# Patient Record
Sex: MALE | Race: WHITE | NOT HISPANIC OR LATINO | URBAN - METROPOLITAN AREA
[De-identification: names, ages, dates, MRNs, and addresses within clinical notes are randomized per-mention and may not be internally consistent; named-entity substitution may affect disease eponyms.]

---

## 2017-09-18 ENCOUNTER — EMERGENCY (EMERGENCY)
Facility: HOSPITAL | Age: 42
LOS: 1 days | Discharge: HOME | End: 2017-09-18
Attending: EMERGENCY MEDICINE | Admitting: EMERGENCY MEDICINE
Payer: MEDICAID

## 2017-09-18 VITALS
DIASTOLIC BLOOD PRESSURE: 77 MMHG | TEMPERATURE: 98 F | HEART RATE: 64 BPM | SYSTOLIC BLOOD PRESSURE: 118 MMHG | OXYGEN SATURATION: 95 % | RESPIRATION RATE: 18 BRPM

## 2017-09-18 VITALS
HEART RATE: 81 BPM | OXYGEN SATURATION: 97 % | DIASTOLIC BLOOD PRESSURE: 73 MMHG | SYSTOLIC BLOOD PRESSURE: 116 MMHG | TEMPERATURE: 98 F | WEIGHT: 250.45 LBS | RESPIRATION RATE: 18 BRPM

## 2017-09-18 DIAGNOSIS — R10.31 RIGHT LOWER QUADRANT PAIN: ICD-10-CM

## 2017-09-18 DIAGNOSIS — K50.90 CROHN'S DISEASE, UNSPECIFIED, WITHOUT COMPLICATIONS: ICD-10-CM

## 2017-09-18 LAB
ALBUMIN SERPL ELPH-MCNC: 4.1 G/DL — SIGNIFICANT CHANGE UP (ref 3.3–5)
ALP SERPL-CCNC: 69 U/L — SIGNIFICANT CHANGE UP (ref 40–120)
ALT FLD-CCNC: 18 U/L — SIGNIFICANT CHANGE UP (ref 10–45)
ANION GAP SERPL CALC-SCNC: 17 MMOL/L — SIGNIFICANT CHANGE UP (ref 5–17)
AST SERPL-CCNC: 32 U/L — SIGNIFICANT CHANGE UP (ref 10–40)
BASOPHILS NFR BLD AUTO: 0.7 % — SIGNIFICANT CHANGE UP (ref 0–2)
BILIRUB SERPL-MCNC: 0.3 MG/DL — SIGNIFICANT CHANGE UP (ref 0.2–1.2)
BUN SERPL-MCNC: 13 MG/DL — SIGNIFICANT CHANGE UP (ref 7–23)
CALCIUM SERPL-MCNC: 8.6 MG/DL — SIGNIFICANT CHANGE UP (ref 8.4–10.5)
CHLORIDE SERPL-SCNC: 100 MMOL/L — SIGNIFICANT CHANGE UP (ref 96–108)
CO2 SERPL-SCNC: 21 MMOL/L — LOW (ref 22–31)
CREAT SERPL-MCNC: 0.9 MG/DL — SIGNIFICANT CHANGE UP (ref 0.5–1.3)
EOSINOPHIL NFR BLD AUTO: 2.7 % — SIGNIFICANT CHANGE UP (ref 0–6)
GLUCOSE SERPL-MCNC: 94 MG/DL — SIGNIFICANT CHANGE UP (ref 70–99)
HCT VFR BLD CALC: 40.2 % — SIGNIFICANT CHANGE UP (ref 39–50)
HGB BLD-MCNC: 13.2 G/DL — SIGNIFICANT CHANGE UP (ref 13–17)
LACTATE SERPL-SCNC: 1.4 MMOL/L — SIGNIFICANT CHANGE UP (ref 0.5–2)
LYMPHOCYTES # BLD AUTO: 21.1 % — SIGNIFICANT CHANGE UP (ref 13–44)
MCHC RBC-ENTMCNC: 28.2 PG — SIGNIFICANT CHANGE UP (ref 27–34)
MCHC RBC-ENTMCNC: 32.8 G/DL — SIGNIFICANT CHANGE UP (ref 32–36)
MCV RBC AUTO: 85.9 FL — SIGNIFICANT CHANGE UP (ref 80–100)
MONOCYTES NFR BLD AUTO: 8.8 % — SIGNIFICANT CHANGE UP (ref 2–14)
NEUTROPHILS NFR BLD AUTO: 66.7 % — SIGNIFICANT CHANGE UP (ref 43–77)
PLATELET # BLD AUTO: 317 K/UL — SIGNIFICANT CHANGE UP (ref 150–400)
POTASSIUM SERPL-MCNC: 4.4 MMOL/L — SIGNIFICANT CHANGE UP (ref 3.5–5.3)
POTASSIUM SERPL-SCNC: 4.4 MMOL/L — SIGNIFICANT CHANGE UP (ref 3.5–5.3)
PROT SERPL-MCNC: 7.9 G/DL — SIGNIFICANT CHANGE UP (ref 6–8.3)
RBC # BLD: 4.68 M/UL — SIGNIFICANT CHANGE UP (ref 4.2–5.8)
RBC # FLD: 14.7 % — SIGNIFICANT CHANGE UP (ref 10.3–16.9)
SODIUM SERPL-SCNC: 138 MMOL/L — SIGNIFICANT CHANGE UP (ref 135–145)
WBC # BLD: 6.9 K/UL — SIGNIFICANT CHANGE UP (ref 3.8–10.5)
WBC # FLD AUTO: 6.9 K/UL — SIGNIFICANT CHANGE UP (ref 3.8–10.5)

## 2017-09-18 PROCEDURE — 83690 ASSAY OF LIPASE: CPT

## 2017-09-18 PROCEDURE — 96376 TX/PRO/DX INJ SAME DRUG ADON: CPT

## 2017-09-18 PROCEDURE — 85025 COMPLETE CBC W/AUTO DIFF WBC: CPT

## 2017-09-18 PROCEDURE — 96374 THER/PROPH/DIAG INJ IV PUSH: CPT | Mod: XU

## 2017-09-18 PROCEDURE — 83605 ASSAY OF LACTIC ACID: CPT

## 2017-09-18 PROCEDURE — 99284 EMERGENCY DEPT VISIT MOD MDM: CPT | Mod: 25

## 2017-09-18 PROCEDURE — 96375 TX/PRO/DX INJ NEW DRUG ADDON: CPT | Mod: XU

## 2017-09-18 PROCEDURE — 99285 EMERGENCY DEPT VISIT HI MDM: CPT

## 2017-09-18 PROCEDURE — 74177 CT ABD & PELVIS W/CONTRAST: CPT

## 2017-09-18 PROCEDURE — 36415 COLL VENOUS BLD VENIPUNCTURE: CPT

## 2017-09-18 PROCEDURE — 80053 COMPREHEN METABOLIC PANEL: CPT

## 2017-09-18 PROCEDURE — 74177 CT ABD & PELVIS W/CONTRAST: CPT | Mod: 26

## 2017-09-18 RX ORDER — SODIUM CHLORIDE 9 MG/ML
3 INJECTION INTRAMUSCULAR; INTRAVENOUS; SUBCUTANEOUS ONCE
Qty: 0 | Refills: 0 | Status: COMPLETED | OUTPATIENT
Start: 2017-09-18 | End: 2017-09-18

## 2017-09-18 RX ORDER — IOHEXOL 300 MG/ML
50 INJECTION, SOLUTION INTRAVENOUS ONCE
Qty: 0 | Refills: 0 | Status: COMPLETED | OUTPATIENT
Start: 2017-09-18 | End: 2017-09-18

## 2017-09-18 RX ORDER — ONDANSETRON 8 MG/1
4 TABLET, FILM COATED ORAL ONCE
Qty: 0 | Refills: 0 | Status: COMPLETED | OUTPATIENT
Start: 2017-09-18 | End: 2017-09-18

## 2017-09-18 RX ORDER — MORPHINE SULFATE 50 MG/1
4 CAPSULE, EXTENDED RELEASE ORAL ONCE
Qty: 0 | Refills: 0 | Status: DISCONTINUED | OUTPATIENT
Start: 2017-09-18 | End: 2017-09-18

## 2017-09-18 RX ORDER — SODIUM CHLORIDE 9 MG/ML
1000 INJECTION INTRAMUSCULAR; INTRAVENOUS; SUBCUTANEOUS ONCE
Qty: 0 | Refills: 0 | Status: COMPLETED | OUTPATIENT
Start: 2017-09-18 | End: 2017-09-18

## 2017-09-18 RX ADMIN — MORPHINE SULFATE 4 MILLIGRAM(S): 50 CAPSULE, EXTENDED RELEASE ORAL at 08:56

## 2017-09-18 RX ADMIN — MORPHINE SULFATE 4 MILLIGRAM(S): 50 CAPSULE, EXTENDED RELEASE ORAL at 08:26

## 2017-09-18 RX ADMIN — ONDANSETRON 4 MILLIGRAM(S): 8 TABLET, FILM COATED ORAL at 08:59

## 2017-09-18 RX ADMIN — MORPHINE SULFATE 4 MILLIGRAM(S): 50 CAPSULE, EXTENDED RELEASE ORAL at 10:41

## 2017-09-18 RX ADMIN — SODIUM CHLORIDE 1000 MILLILITER(S): 9 INJECTION INTRAMUSCULAR; INTRAVENOUS; SUBCUTANEOUS at 08:59

## 2017-09-18 RX ADMIN — SODIUM CHLORIDE 3 MILLILITER(S): 9 INJECTION INTRAMUSCULAR; INTRAVENOUS; SUBCUTANEOUS at 08:47

## 2017-09-18 RX ADMIN — IOHEXOL 50 MILLILITER(S): 300 INJECTION, SOLUTION INTRAVENOUS at 08:58

## 2017-09-18 NOTE — ED PROVIDER NOTE - OBJECTIVE STATEMENT
Patient is a 41 year old male with PMH Crohn's disease who presents to ED c/o RLQ abdominal pain, associated with multiple episodes of bloody diarrhea and vomiting x few days. Pt reports that symptoms are similar to history of Crohn's flare. His last flare was 3 months ago in [facility]. Pt seen here for similar flare last year, was advised to start Remicade treatment, however he has not as of yet because he has been traveling back and forth. Was last treated with mesalamine 3 months ago. Previously on long term steroids. Denies fever, chills, urinary symptoms, back pain, chest pain. Reports a "surgical procedure for Crohn's, unclear what was done." Admits to drinking 6 beers last night.

## 2017-09-18 NOTE — ED PROVIDER NOTE - ATTENDING CONTRIBUTION TO CARE
Young M hx of Crohns presenting with RLQ multiple episodes of loose nonbloody stool x days.  No fever, chills.  Pt well, mild RLQ ttp, no r/g.  VSS.  Labs and CT noted.  Plan outpt tx and fu for likely Crohns flare.

## 2017-09-18 NOTE — ED PROVIDER NOTE - MEDICAL DECISION MAKING DETAILS
Patient is a 41 year old male with PMH Crohn's disease who presents to ED c/o RLQ abdominal pain, associated with multiple episodes of bloody diarrhea and vomiting x few days. Morphine, zofran and IV NS given. Labs sent-unremarkable, normal wbc. CT consistent with Crohn's, no obstruction, no abscess. Pt tolerating PO, vitals stable. Will start on short term steroids, 5mg PO prednisone. Arrangements made for GI follow up by ED coordinator.

## 2017-09-18 NOTE — ED ADULT NURSE NOTE - OBJECTIVE STATEMENT
Pt w PMH of Crohn's and Hep C presents to ED c/o severe LLQ abdom pain associated w n/v/d. Pt unsure if his appendix is intact. He states he hasn't slept in days due to sx and last night consumed a six pack of beer and fell asleep and came to ED this morning.

## 2018-06-07 VITALS
RESPIRATION RATE: 18 BRPM | DIASTOLIC BLOOD PRESSURE: 86 MMHG | OXYGEN SATURATION: 96 % | TEMPERATURE: 98 F | HEART RATE: 116 BPM | SYSTOLIC BLOOD PRESSURE: 120 MMHG

## 2018-06-07 LAB
ALBUMIN SERPL ELPH-MCNC: 4.4 G/DL — SIGNIFICANT CHANGE UP (ref 3.3–5)
ALP SERPL-CCNC: 92 U/L — SIGNIFICANT CHANGE UP (ref 40–120)
ALT FLD-CCNC: 68 U/L — HIGH (ref 10–45)
ANION GAP SERPL CALC-SCNC: 13 MMOL/L — SIGNIFICANT CHANGE UP (ref 5–17)
AST SERPL-CCNC: 74 U/L — HIGH (ref 10–40)
BASOPHILS NFR BLD AUTO: 0.5 % — SIGNIFICANT CHANGE UP (ref 0–2)
BILIRUB SERPL-MCNC: 0.5 MG/DL — SIGNIFICANT CHANGE UP (ref 0.2–1.2)
BUN SERPL-MCNC: 10 MG/DL — SIGNIFICANT CHANGE UP (ref 7–23)
CALCIUM SERPL-MCNC: 10.3 MG/DL — SIGNIFICANT CHANGE UP (ref 8.4–10.5)
CHLORIDE SERPL-SCNC: 98 MMOL/L — SIGNIFICANT CHANGE UP (ref 96–108)
CO2 SERPL-SCNC: 27 MMOL/L — SIGNIFICANT CHANGE UP (ref 22–31)
CREAT SERPL-MCNC: 1.04 MG/DL — SIGNIFICANT CHANGE UP (ref 0.5–1.3)
CRP SERPL-MCNC: 0.56 MG/DL — HIGH (ref 0–0.4)
EOSINOPHIL NFR BLD AUTO: 1 % — SIGNIFICANT CHANGE UP (ref 0–6)
ERYTHROCYTE [SEDIMENTATION RATE] IN BLOOD: 13 MM/HR — SIGNIFICANT CHANGE UP
GLUCOSE SERPL-MCNC: 128 MG/DL — HIGH (ref 70–99)
HCT VFR BLD CALC: 43.5 % — SIGNIFICANT CHANGE UP (ref 39–50)
HGB BLD-MCNC: 14.6 G/DL — SIGNIFICANT CHANGE UP (ref 13–17)
LIDOCAIN IGE QN: 69 U/L — HIGH (ref 7–60)
LYMPHOCYTES # BLD AUTO: 17.1 % — SIGNIFICANT CHANGE UP (ref 13–44)
MCHC RBC-ENTMCNC: 29.3 PG — SIGNIFICANT CHANGE UP (ref 27–34)
MCHC RBC-ENTMCNC: 33.6 G/DL — SIGNIFICANT CHANGE UP (ref 32–36)
MCV RBC AUTO: 87.3 FL — SIGNIFICANT CHANGE UP (ref 80–100)
MONOCYTES NFR BLD AUTO: 11 % — SIGNIFICANT CHANGE UP (ref 2–14)
NEUTROPHILS NFR BLD AUTO: 70.4 % — SIGNIFICANT CHANGE UP (ref 43–77)
PLATELET # BLD AUTO: 266 K/UL — SIGNIFICANT CHANGE UP (ref 150–400)
POTASSIUM SERPL-MCNC: 4 MMOL/L — SIGNIFICANT CHANGE UP (ref 3.5–5.3)
POTASSIUM SERPL-SCNC: 4 MMOL/L — SIGNIFICANT CHANGE UP (ref 3.5–5.3)
PROT SERPL-MCNC: 8.1 G/DL — SIGNIFICANT CHANGE UP (ref 6–8.3)
RBC # BLD: 4.98 M/UL — SIGNIFICANT CHANGE UP (ref 4.2–5.8)
RBC # FLD: 14.5 % — SIGNIFICANT CHANGE UP (ref 10.3–16.9)
SODIUM SERPL-SCNC: 138 MMOL/L — SIGNIFICANT CHANGE UP (ref 135–145)
WBC # BLD: 5.8 K/UL — SIGNIFICANT CHANGE UP (ref 3.8–10.5)
WBC # FLD AUTO: 5.8 K/UL — SIGNIFICANT CHANGE UP (ref 3.8–10.5)

## 2018-06-07 PROCEDURE — 99284 EMERGENCY DEPT VISIT MOD MDM: CPT | Mod: 25

## 2018-06-07 PROCEDURE — 74018 RADEX ABDOMEN 1 VIEW: CPT | Mod: 26

## 2018-06-07 PROCEDURE — 93010 ELECTROCARDIOGRAM REPORT: CPT

## 2018-06-07 PROCEDURE — 74177 CT ABD & PELVIS W/CONTRAST: CPT | Mod: 26

## 2018-06-07 RX ORDER — MORPHINE SULFATE 50 MG/1
4 CAPSULE, EXTENDED RELEASE ORAL ONCE
Qty: 0 | Refills: 0 | Status: DISCONTINUED | OUTPATIENT
Start: 2018-06-07 | End: 2018-06-07

## 2018-06-07 RX ORDER — HYDROMORPHONE HYDROCHLORIDE 2 MG/ML
1 INJECTION INTRAMUSCULAR; INTRAVENOUS; SUBCUTANEOUS ONCE
Qty: 0 | Refills: 0 | Status: DISCONTINUED | OUTPATIENT
Start: 2018-06-07 | End: 2018-06-07

## 2018-06-07 RX ORDER — MOXIFLOXACIN HYDROCHLORIDE TABLETS, 400 MG 400 MG/1
1 TABLET, FILM COATED ORAL
Qty: 14 | Refills: 0 | OUTPATIENT
Start: 2018-06-07 | End: 2018-06-13

## 2018-06-07 RX ORDER — CIPROFLOXACIN LACTATE 400MG/40ML
400 VIAL (ML) INTRAVENOUS ONCE
Qty: 0 | Refills: 0 | Status: COMPLETED | OUTPATIENT
Start: 2018-06-07 | End: 2018-06-07

## 2018-06-07 RX ORDER — KETOROLAC TROMETHAMINE 30 MG/ML
30 SYRINGE (ML) INJECTION ONCE
Qty: 0 | Refills: 0 | Status: DISCONTINUED | OUTPATIENT
Start: 2018-06-07 | End: 2018-06-07

## 2018-06-07 RX ORDER — METRONIDAZOLE 500 MG
1 TABLET ORAL
Qty: 21 | Refills: 0 | OUTPATIENT
Start: 2018-06-07 | End: 2018-06-13

## 2018-06-07 RX ORDER — IOHEXOL 300 MG/ML
50 INJECTION, SOLUTION INTRAVENOUS ONCE
Qty: 0 | Refills: 0 | Status: COMPLETED | OUTPATIENT
Start: 2018-06-07 | End: 2018-06-07

## 2018-06-07 RX ORDER — OXYCODONE AND ACETAMINOPHEN 5; 325 MG/1; MG/1
2 TABLET ORAL ONCE
Qty: 0 | Refills: 0 | Status: DISCONTINUED | OUTPATIENT
Start: 2018-06-07 | End: 2018-06-07

## 2018-06-07 RX ORDER — MORPHINE SULFATE 50 MG/1
8 CAPSULE, EXTENDED RELEASE ORAL ONCE
Qty: 0 | Refills: 0 | Status: DISCONTINUED | OUTPATIENT
Start: 2018-06-07 | End: 2018-06-07

## 2018-06-07 RX ORDER — METRONIDAZOLE 500 MG
500 TABLET ORAL ONCE
Qty: 0 | Refills: 0 | Status: COMPLETED | OUTPATIENT
Start: 2018-06-07 | End: 2018-06-07

## 2018-06-07 RX ORDER — ONDANSETRON 8 MG/1
4 TABLET, FILM COATED ORAL ONCE
Qty: 0 | Refills: 0 | Status: COMPLETED | OUTPATIENT
Start: 2018-06-07 | End: 2018-06-07

## 2018-06-07 RX ADMIN — MORPHINE SULFATE 8 MILLIGRAM(S): 50 CAPSULE, EXTENDED RELEASE ORAL at 17:04

## 2018-06-07 RX ADMIN — MORPHINE SULFATE 8 MILLIGRAM(S): 50 CAPSULE, EXTENDED RELEASE ORAL at 20:43

## 2018-06-07 RX ADMIN — Medication 200 MILLIGRAM(S): at 20:18

## 2018-06-07 RX ADMIN — MORPHINE SULFATE 4 MILLIGRAM(S): 50 CAPSULE, EXTENDED RELEASE ORAL at 20:21

## 2018-06-07 RX ADMIN — Medication 100 MILLIGRAM(S): at 20:39

## 2018-06-07 RX ADMIN — IOHEXOL 50 MILLILITER(S): 300 INJECTION, SOLUTION INTRAVENOUS at 16:19

## 2018-06-07 RX ADMIN — Medication 30 MILLIGRAM(S): at 20:39

## 2018-06-07 RX ADMIN — Medication 30 MILLIGRAM(S): at 16:00

## 2018-06-07 RX ADMIN — ONDANSETRON 104 MILLIGRAM(S): 8 TABLET, FILM COATED ORAL at 16:53

## 2018-06-07 NOTE — ED PROVIDER NOTE - PROGRESS NOTE DETAILS
surgery consult in ED and plan medical admission for Crohn's flare no overt concerns for obstruction and  no surgical plans ( surgery to follow as consult )

## 2018-06-07 NOTE — ED ADULT NURSE NOTE - OBJECTIVE STATEMENT
pt presents to RLQ abdominal pain for four days w/ six episodes of blood in stool. hx of chrohn. denies n/v.

## 2018-06-07 NOTE — ED ADULT TRIAGE NOTE - CHIEF COMPLAINT QUOTE
pt has a history of chron' s disease , pt c/o abdominal pain for 4 days , pt also c/o diarrhea , noticed blood in stool , pt also c/o weakness , tp states " I amlmost passed out 3 tiems this week "

## 2018-06-07 NOTE — ED PROVIDER NOTE - MEDICAL DECISION MAKING DETAILS
41 y/o male with hx Chron's disease currently symptomatic. Check labs, pain control, nausea control. Order abd CT 41 y/o male with hx Chron's disease currently symptomatic. Check labs, pain control, nausea control.  Abd CT c/w chron's dx, with A few mildly dilated loops of small bowel with possible segments of small  bowel luminal narrowing. Cannot exclude mild/low grade obstruction. also terminal ileum inflammation.  Patient started on IV antibiotics Cipro and Flagyl. Pt with abd XR follow-up after CT to evaluate for pasage of contrast to rectum to r/o obstruction.  Hand off given to Dr. Cannon and ELI Riley

## 2018-06-07 NOTE — ED PROVIDER NOTE - OBJECTIVE STATEMENT
43 y/o male with hx Chron' s disease presents with R lower and upper quad abd pain. + diarrhea with blood, + vomiting, +C/-F .  Patient currently on a steroid taper. Patient believes this a Chron's flare 43 y/o male with hx Chron' s disease presents with R lower and upper quad abd pain. + diarrhea with blood, + vomiting, +C/-F .  Patient currently on a steroid taper. Patient believes this a Chron's flare. Patient st non compliance with NSAIDs as he could not afford it. 41 y/o male with hx Chron' s disease presents with R lower and upper quad abd pain. + diarrhea with blood, + vomiting, +C/-F .  Patient currently on a steroid taper. Patient believes this a Chron' s flare. Patient st non compliance with NSAIDs as he could not afford it. Will d/c to home after cipro, flagyl IV and repeat XR and pain control

## 2018-06-08 ENCOUNTER — INPATIENT (INPATIENT)
Facility: HOSPITAL | Age: 43
LOS: 1 days | Discharge: HOME | DRG: 386 | End: 2018-06-10
Attending: STUDENT IN AN ORGANIZED HEALTH CARE EDUCATION/TRAINING PROGRAM | Admitting: STUDENT IN AN ORGANIZED HEALTH CARE EDUCATION/TRAINING PROGRAM
Payer: COMMERCIAL

## 2018-06-08 DIAGNOSIS — R74.0 NONSPECIFIC ELEVATION OF LEVELS OF TRANSAMINASE AND LACTIC ACID DEHYDROGENASE [LDH]: ICD-10-CM

## 2018-06-08 DIAGNOSIS — R63.8 OTHER SYMPTOMS AND SIGNS CONCERNING FOOD AND FLUID INTAKE: ICD-10-CM

## 2018-06-08 DIAGNOSIS — Z29.9 ENCOUNTER FOR PROPHYLACTIC MEASURES, UNSPECIFIED: ICD-10-CM

## 2018-06-08 DIAGNOSIS — B19.20 UNSPECIFIED VIRAL HEPATITIS C WITHOUT HEPATIC COMA: ICD-10-CM

## 2018-06-08 DIAGNOSIS — K50.10 CROHN'S DISEASE OF LARGE INTESTINE WITHOUT COMPLICATIONS: ICD-10-CM

## 2018-06-08 LAB
ALBUMIN SERPL ELPH-MCNC: 4 G/DL — SIGNIFICANT CHANGE UP (ref 3.3–5)
ALP SERPL-CCNC: 84 U/L — SIGNIFICANT CHANGE UP (ref 40–120)
ALT FLD-CCNC: 59 U/L — HIGH (ref 10–45)
ANION GAP SERPL CALC-SCNC: 12 MMOL/L — SIGNIFICANT CHANGE UP (ref 5–17)
APTT BLD: 35.2 SEC — SIGNIFICANT CHANGE UP (ref 27.5–37.4)
AST SERPL-CCNC: 59 U/L — HIGH (ref 10–40)
BILIRUB SERPL-MCNC: 0.8 MG/DL — SIGNIFICANT CHANGE UP (ref 0.2–1.2)
BUN SERPL-MCNC: 14 MG/DL — SIGNIFICANT CHANGE UP (ref 7–23)
C DIFF GDH STL QL: NEGATIVE — SIGNIFICANT CHANGE UP
C DIFF GDH STL QL: SIGNIFICANT CHANGE UP
CALCIUM SERPL-MCNC: 9.5 MG/DL — SIGNIFICANT CHANGE UP (ref 8.4–10.5)
CHLORIDE SERPL-SCNC: 98 MMOL/L — SIGNIFICANT CHANGE UP (ref 96–108)
CO2 SERPL-SCNC: 25 MMOL/L — SIGNIFICANT CHANGE UP (ref 22–31)
CREAT SERPL-MCNC: 1.08 MG/DL — SIGNIFICANT CHANGE UP (ref 0.5–1.3)
EXTRA SST TUBE: SIGNIFICANT CHANGE UP
GLUCOSE BLDC GLUCOMTR-MCNC: 128 MG/DL — HIGH (ref 70–99)
GLUCOSE BLDC GLUCOMTR-MCNC: 135 MG/DL — HIGH (ref 70–99)
GLUCOSE BLDC GLUCOMTR-MCNC: 136 MG/DL — HIGH (ref 70–99)
GLUCOSE BLDC GLUCOMTR-MCNC: 197 MG/DL — HIGH (ref 70–99)
GLUCOSE SERPL-MCNC: 115 MG/DL — HIGH (ref 70–99)
HCT VFR BLD CALC: 42.4 % — SIGNIFICANT CHANGE UP (ref 39–50)
HGB BLD-MCNC: 13.7 G/DL — SIGNIFICANT CHANGE UP (ref 13–17)
INR BLD: 0.9 — SIGNIFICANT CHANGE UP (ref 0.88–1.16)
MAGNESIUM SERPL-MCNC: 2.1 MG/DL — SIGNIFICANT CHANGE UP (ref 1.6–2.6)
MCHC RBC-ENTMCNC: 28.6 PG — SIGNIFICANT CHANGE UP (ref 27–34)
MCHC RBC-ENTMCNC: 32.3 G/DL — SIGNIFICANT CHANGE UP (ref 32–36)
MCV RBC AUTO: 88.5 FL — SIGNIFICANT CHANGE UP (ref 80–100)
PHOSPHATE SERPL-MCNC: 3.2 MG/DL — SIGNIFICANT CHANGE UP (ref 2.5–4.5)
PLATELET # BLD AUTO: 227 K/UL — SIGNIFICANT CHANGE UP (ref 150–400)
POTASSIUM SERPL-MCNC: 4.3 MMOL/L — SIGNIFICANT CHANGE UP (ref 3.5–5.3)
POTASSIUM SERPL-SCNC: 4.3 MMOL/L — SIGNIFICANT CHANGE UP (ref 3.5–5.3)
PROT SERPL-MCNC: 7.7 G/DL — SIGNIFICANT CHANGE UP (ref 6–8.3)
PROTHROM AB SERPL-ACNC: 10 SEC — SIGNIFICANT CHANGE UP (ref 9.8–12.7)
RBC # BLD: 4.79 M/UL — SIGNIFICANT CHANGE UP (ref 4.2–5.8)
RBC # FLD: 14.5 % — SIGNIFICANT CHANGE UP (ref 10.3–16.9)
SODIUM SERPL-SCNC: 135 MMOL/L — SIGNIFICANT CHANGE UP (ref 135–145)
WBC # BLD: 6.6 K/UL — SIGNIFICANT CHANGE UP (ref 3.8–10.5)
WBC # FLD AUTO: 6.6 K/UL — SIGNIFICANT CHANGE UP (ref 3.8–10.5)

## 2018-06-08 PROCEDURE — 74018 RADEX ABDOMEN 1 VIEW: CPT | Mod: 26

## 2018-06-08 PROCEDURE — 99223 1ST HOSP IP/OBS HIGH 75: CPT | Mod: GC

## 2018-06-08 PROCEDURE — 12345: CPT | Mod: GC,NC

## 2018-06-08 PROCEDURE — 99222 1ST HOSP IP/OBS MODERATE 55: CPT | Mod: GC

## 2018-06-08 RX ORDER — METRONIDAZOLE 500 MG
500 TABLET ORAL EVERY 8 HOURS
Qty: 0 | Refills: 0 | Status: DISCONTINUED | OUTPATIENT
Start: 2018-06-08 | End: 2018-06-08

## 2018-06-08 RX ORDER — CEFTRIAXONE 500 MG/1
INJECTION, POWDER, FOR SOLUTION INTRAMUSCULAR; INTRAVENOUS
Qty: 0 | Refills: 0 | Status: DISCONTINUED | OUTPATIENT
Start: 2018-06-08 | End: 2018-06-08

## 2018-06-08 RX ORDER — HEPARIN SODIUM 5000 [USP'U]/ML
5000 INJECTION INTRAVENOUS; SUBCUTANEOUS EVERY 8 HOURS
Qty: 0 | Refills: 0 | Status: DISCONTINUED | OUTPATIENT
Start: 2018-06-08 | End: 2018-06-10

## 2018-06-08 RX ORDER — DEXTROSE 50 % IN WATER 50 %
25 SYRINGE (ML) INTRAVENOUS ONCE
Qty: 0 | Refills: 0 | Status: DISCONTINUED | OUTPATIENT
Start: 2018-06-08 | End: 2018-06-10

## 2018-06-08 RX ORDER — INSULIN LISPRO 100/ML
VIAL (ML) SUBCUTANEOUS
Qty: 0 | Refills: 0 | Status: DISCONTINUED | OUTPATIENT
Start: 2018-06-08 | End: 2018-06-10

## 2018-06-08 RX ORDER — SODIUM CHLORIDE 9 MG/ML
1000 INJECTION INTRAMUSCULAR; INTRAVENOUS; SUBCUTANEOUS
Qty: 0 | Refills: 0 | Status: DISCONTINUED | OUTPATIENT
Start: 2018-06-08 | End: 2018-06-09

## 2018-06-08 RX ORDER — ACETAMINOPHEN 500 MG
650 TABLET ORAL EVERY 6 HOURS
Qty: 0 | Refills: 0 | Status: DISCONTINUED | OUTPATIENT
Start: 2018-06-08 | End: 2018-06-10

## 2018-06-08 RX ORDER — DEXTROSE 50 % IN WATER 50 %
12.5 SYRINGE (ML) INTRAVENOUS ONCE
Qty: 0 | Refills: 0 | Status: DISCONTINUED | OUTPATIENT
Start: 2018-06-08 | End: 2018-06-10

## 2018-06-08 RX ORDER — SODIUM CHLORIDE 9 MG/ML
1000 INJECTION, SOLUTION INTRAVENOUS
Qty: 0 | Refills: 0 | Status: DISCONTINUED | OUTPATIENT
Start: 2018-06-08 | End: 2018-06-10

## 2018-06-08 RX ORDER — PANTOPRAZOLE SODIUM 20 MG/1
40 TABLET, DELAYED RELEASE ORAL
Qty: 0 | Refills: 0 | Status: DISCONTINUED | OUTPATIENT
Start: 2018-06-08 | End: 2018-06-10

## 2018-06-08 RX ORDER — CEFTRIAXONE 500 MG/1
1 INJECTION, POWDER, FOR SOLUTION INTRAMUSCULAR; INTRAVENOUS ONCE
Qty: 0 | Refills: 0 | Status: COMPLETED | OUTPATIENT
Start: 2018-06-08 | End: 2018-06-08

## 2018-06-08 RX ORDER — ONDANSETRON 8 MG/1
4 TABLET, FILM COATED ORAL EVERY 6 HOURS
Qty: 0 | Refills: 0 | Status: DISCONTINUED | OUTPATIENT
Start: 2018-06-08 | End: 2018-06-10

## 2018-06-08 RX ORDER — DEXTROSE 50 % IN WATER 50 %
15 SYRINGE (ML) INTRAVENOUS ONCE
Qty: 0 | Refills: 0 | Status: DISCONTINUED | OUTPATIENT
Start: 2018-06-08 | End: 2018-06-10

## 2018-06-08 RX ORDER — GLUCAGON INJECTION, SOLUTION 0.5 MG/.1ML
1 INJECTION, SOLUTION SUBCUTANEOUS ONCE
Qty: 0 | Refills: 0 | Status: DISCONTINUED | OUTPATIENT
Start: 2018-06-08 | End: 2018-06-10

## 2018-06-08 RX ORDER — MORPHINE SULFATE 50 MG/1
2 CAPSULE, EXTENDED RELEASE ORAL EVERY 6 HOURS
Qty: 0 | Refills: 0 | Status: DISCONTINUED | OUTPATIENT
Start: 2018-06-08 | End: 2018-06-10

## 2018-06-08 RX ADMIN — HEPARIN SODIUM 5000 UNIT(S): 5000 INJECTION INTRAVENOUS; SUBCUTANEOUS at 14:09

## 2018-06-08 RX ADMIN — Medication 100 MILLIGRAM(S): at 03:41

## 2018-06-08 RX ADMIN — SODIUM CHLORIDE 100 MILLILITER(S): 9 INJECTION INTRAMUSCULAR; INTRAVENOUS; SUBCUTANEOUS at 14:09

## 2018-06-08 RX ADMIN — MORPHINE SULFATE 2 MILLIGRAM(S): 50 CAPSULE, EXTENDED RELEASE ORAL at 09:21

## 2018-06-08 RX ADMIN — Medication 40 MILLIGRAM(S): at 12:39

## 2018-06-08 RX ADMIN — MORPHINE SULFATE 2 MILLIGRAM(S): 50 CAPSULE, EXTENDED RELEASE ORAL at 03:14

## 2018-06-08 RX ADMIN — PANTOPRAZOLE SODIUM 40 MILLIGRAM(S): 20 TABLET, DELAYED RELEASE ORAL at 07:18

## 2018-06-08 RX ADMIN — HEPARIN SODIUM 5000 UNIT(S): 5000 INJECTION INTRAVENOUS; SUBCUTANEOUS at 07:18

## 2018-06-08 RX ADMIN — Medication 2: at 17:43

## 2018-06-08 RX ADMIN — Medication 40 MILLIGRAM(S): at 03:41

## 2018-06-08 RX ADMIN — MORPHINE SULFATE 2 MILLIGRAM(S): 50 CAPSULE, EXTENDED RELEASE ORAL at 15:37

## 2018-06-08 RX ADMIN — MORPHINE SULFATE 2 MILLIGRAM(S): 50 CAPSULE, EXTENDED RELEASE ORAL at 22:52

## 2018-06-08 RX ADMIN — MORPHINE SULFATE 2 MILLIGRAM(S): 50 CAPSULE, EXTENDED RELEASE ORAL at 03:00

## 2018-06-08 RX ADMIN — MORPHINE SULFATE 2 MILLIGRAM(S): 50 CAPSULE, EXTENDED RELEASE ORAL at 15:22

## 2018-06-08 RX ADMIN — HEPARIN SODIUM 5000 UNIT(S): 5000 INJECTION INTRAVENOUS; SUBCUTANEOUS at 22:52

## 2018-06-08 RX ADMIN — Medication 650 MILLIGRAM(S): at 17:43

## 2018-06-08 RX ADMIN — MORPHINE SULFATE 2 MILLIGRAM(S): 50 CAPSULE, EXTENDED RELEASE ORAL at 09:36

## 2018-06-08 RX ADMIN — MORPHINE SULFATE 2 MILLIGRAM(S): 50 CAPSULE, EXTENDED RELEASE ORAL at 23:07

## 2018-06-08 RX ADMIN — CEFTRIAXONE 100 GRAM(S): 500 INJECTION, POWDER, FOR SOLUTION INTRAMUSCULAR; INTRAVENOUS at 03:00

## 2018-06-08 RX ADMIN — SODIUM CHLORIDE 100 MILLILITER(S): 9 INJECTION INTRAMUSCULAR; INTRAVENOUS; SUBCUTANEOUS at 03:00

## 2018-06-08 RX ADMIN — Medication 100 MILLIGRAM(S): at 12:39

## 2018-06-08 NOTE — PROGRESS NOTE ADULT - SUBJECTIVE AND OBJECTIVE BOX
OVERNIGHT EVENTS:    SUBJECTIVE / INTERVAL HPI: Patient seen and examined at bedside.     VITAL SIGNS:  Vital Signs Last 24 Hrs  T(C): 36.6 (08 Jun 2018 08:47), Max: 37.2 (08 Jun 2018 05:11)  T(F): 97.9 (08 Jun 2018 08:47), Max: 98.9 (08 Jun 2018 05:11)  HR: 76 (08 Jun 2018 08:47) (72 - 116)  BP: 132/85 (08 Jun 2018 08:47) (103/71 - 132/85)  BP(mean): --  RR: 18 (08 Jun 2018 08:47) (17 - 18)  SpO2: 95% (08 Jun 2018 08:47) (95% - 97%)    PHYSICAL EXAM:    General: WDWN  HEENT: NC/AT; PERRL, anicteric sclera; MMM  Neck: supple  Cardiovascular: +S1/S2; RRR  Respiratory: CTA B/L; no W/R/R  Gastrointestinal: soft, NT/ND; +BSx4  Extremities: WWP; no edema, clubbing or cyanosis  Vascular: 2+ radial, DP/PT pulses B/L  Neurological: AAOx3; no focal deficits    MEDICATIONS:  MEDICATIONS  (STANDING):  dextrose 5%. 1000 milliLiter(s) (50 mL/Hr) IV Continuous <Continuous>  dextrose 50% Injectable 12.5 Gram(s) IV Push once  dextrose 50% Injectable 25 Gram(s) IV Push once  dextrose 50% Injectable 25 Gram(s) IV Push once  heparin  Injectable 5000 Unit(s) SubCutaneous every 8 hours  insulin lispro (HumaLOG) corrective regimen sliding scale   SubCutaneous Before meals and at bedtime  pantoprazole    Tablet 40 milliGRAM(s) Oral before breakfast  sodium chloride 0.9%. 1000 milliLiter(s) (100 mL/Hr) IV Continuous <Continuous>    MEDICATIONS  (PRN):  dextrose 40% Gel 15 Gram(s) Oral once PRN Blood Glucose LESS THAN 70 milliGRAM(s)/deciliter  glucagon  Injectable 1 milliGRAM(s) IntraMuscular once PRN Glucose LESS THAN 70 milligrams/deciliter  morphine  - Injectable 2 milliGRAM(s) IV Push every 6 hours PRN Severe Pain (7 - 10)  ondansetron Injectable 4 milliGRAM(s) IV Push every 6 hours PRN Nausea      ALLERGIES:  Allergies    No Known Allergies    Intolerances        LABS:                        13.7   6.6   )-----------( 227      ( 08 Jun 2018 07:08 )             42.4     06-08    135  |  98  |  14  ----------------------------<  115<H>  4.3   |  25  |  1.08    Ca    9.5      08 Jun 2018 07:08  Phos  3.2     06-08  Mg     2.1     06-08    TPro  7.7  /  Alb  4.0  /  TBili  0.8  /  DBili  x   /  AST  59<H>  /  ALT  59<H>  /  AlkPhos  84  06-08    PT/INR - ( 08 Jun 2018 07:08 )   PT: 10.0 sec;   INR: 0.90          PTT - ( 08 Jun 2018 07:08 )  PTT:35.2 sec    CAPILLARY BLOOD GLUCOSE      POCT Blood Glucose.: 135 mg/dL (08 Jun 2018 12:08)      RADIOLOGY & ADDITIONAL TESTS: Reviewed.    ASSESSMENT:    PLAN:

## 2018-06-08 NOTE — PROGRESS NOTE ADULT - ASSESSMENT
Mr. 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

## 2018-06-08 NOTE — CONSULT NOTE ADULT - ASSESSMENT
42yoM with PMH HCV, EtOH abuse, Crohn's disease recently hospitalized for a flare and discharged on a steroid taper and Pentasa, presents to the ED with 4-day history of abdominal pain, diarrhea, and emesis    No surgical intervention indicated at this time  Recommend medical/GI consult for Crohn's management, concern for terminal ileitis  If pt develops obstructive symptoms, please call Surgery for evaluation and possible NGT placement  Please repeat AXR in the morning to evaluate contrast flow  Will follow on Team 4C. Please call with any questions
41 YO M livan historian with PMHx Crohn's disease (Dx 9/2007) and chronic hepatitis C (no prior treatment) 2/2 tattoo ink needles and alcohol use p/w diarrhea, abdominal pain, vomiting, and joint pain x 4 days.    # Crohn's disease with flare  - Agree with ruling out for C. diff  - Recommend decreasing to Solumedrol 40 mg IVP daily  - Recommend discontinuing ciprofloxacin and flagyl as pt does not meet SIRS criteria and low suspicion for underlying infectious etiology of his symptoms  - Please check Hep B serology and quantiferon gold for possible biologic treatment in the future  - Discussed with pt at length regarding need for follow-up with his primary GI and initiation of long term maintenance therapy to control his CD flares, avoidance of the adverse effects of long term steroid use, and avoiding hospital admissions as he has had multiple admission for flares and has poor control of his underlying illness  - Please obtain report of his recent colonoscopy at [facility]    # Chronic HCV - stable  - Pt to follow-up with his primary GI for consideration of tx    Case d/w Dr. Dey  GI will follow

## 2018-06-08 NOTE — H&P ADULT - HISTORY OF PRESENT ILLNESS
Mr. 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 Patient has a longstanding history of Crohn's disease. He has never had a consistent Gastroenterologist to follow up with. He has been on Humira but said 'it didn't work,' and most recently he was supposed to be on Pentasa but has not been compliant because it was too expensive for him to afford. He has multiple admissions for abdominal pain 2/2 Crohn's flare. His last admission was at [place] where he was hospitalized for 1 week and was d/c 2 weeks ago. He was given antibx and steroids (d/c on Prednisone taper). Four days ago, he began having his 'usual Crohn's symptoms.' He endorses decreased appetite, abdominal pain that was sharp and radiating everywhere (mostly RLQ), and multiple episodes of diarrhea with blood streaks. He also felt sweaty but did not check his temperature. Has also had nausea and vomiting and cannot keep food down. He was supposed to see a GI doctor yesterday but couldn't make it due to severe pain.   He does not know when his last colonoscopy was.     No chills, sob, cp or recent travel sick contacts or exotic foods.     In ED VSS except tachycardia to around 110-120.     Given Cipro and Flagyl.     CT a/p performed and showed " Stigmata of chronic Crohn's disease. 5 cm segment of mild wall thickening and mucosal enhancement of the terminal ileum suspicious for mild acute inflammation. A few mildly dilated loops of small bowel with possible segments of small bowel luminal narrowing. Cannot exclude mild/low grade obstruction. Consider follow-up radiographs to evaluate progression of oral contrast to colon."    Surgery consulted and recommended only medical management and AXR in AM.

## 2018-06-08 NOTE — H&P ADULT - PROBLEM SELECTOR PLAN 1
patient presents with RLQ abdominal pain and diarrhea with hematochezia. he was recently d/c from [facility] after a Crohn's flare and has been on a prednisone taper. His symptoms began 4 days ago and have gotten worse. He is noncompliant with his maintenance therapy of Pentasa and reportedly has 'failed' biologics before, although he is a poor historian.   His crohn's disease activity index is 230 which correlates with mod to severe disease.   He was also tachycardic on presentation and dry on exam.   s/p Cipro and flagyl in ED.   CT a/p shows "Stigmata of chronic Crohn's disease. 5 cm segment of mild wall thickening and mucosal enhancement of the terminal ileum suspicious for mild acute inflammation. A few mildly dilated loops of small bowel with possible segments of small bowel luminal narrowing. Cannot exclude mild/low grade obstruction. Consider follow-up radiographs to evaluate progression of oral contrast to colon."  seen by surgery who recommend medical management but will follow   - NS at 100cc/hr for hydration  - Ceftriaxone 1q24 and flagyl 500q8  - check stool count, culture, o&p, and check for c-diff as common in IBD population (plus, patient was recently hospitalized and given antibx)  - consult GI in AM  - f/u subsequent surgery recs  - f/u AM AXR  - ensure adequate pain control  - monitor frequency and severity of BM's

## 2018-06-08 NOTE — H&P ADULT - PROBLEM SELECTOR PLAN 3
AST/ALT slightly elevated. likely from chronic hep C infection (untreated).   - can check RUQ u/s to r/o nodules/cirrhosis but not likely

## 2018-06-08 NOTE — MEDICAL STUDENT PROGRESS NOTE(EDUCATION) - SUBJECTIVE AND OBJECTIVE BOX
INTERVAL HPI/OVERNIGHT EVENTS:    OVERNIGHT: No overnight events.  SUBJECTIVE: Patient seen and examined at bedside.     ROS:  CV: Denies chest pain  Resp: Denies SOB  GI: Denies abdominal pain, constipation, diarrhea, nausea, vomiting  : Denies dysuria  ID: Denies fevers, chills  MSK: Denies joint pain     OBJECTIVE:    VITAL SIGNS:  ICU Vital Signs Last 24 Hrs  T(C): 37.2 (08 Jun 2018 05:11), Max: 37.2 (08 Jun 2018 05:11)  T(F): 98.9 (08 Jun 2018 05:11), Max: 98.9 (08 Jun 2018 05:11)  HR: 72 (08 Jun 2018 05:11) (72 - 116)  BP: 103/71 (08 Jun 2018 05:11) (103/71 - 127/82)  BP(mean): --  ABP: --  ABP(mean): --  RR: 18 (08 Jun 2018 05:11) (17 - 18)  SpO2: 97% (08 Jun 2018 05:11) (95% - 97%)        06-07 @ 07:01  -  06-08 @ 07:00  --------------------------------------------------------  IN: 350 mL / OUT: 0 mL / NET: 350 mL      CAPILLARY BLOOD GLUCOSE      POCT Blood Glucose.: 136 mg/dL (08 Jun 2018 08:18)      PHYSICAL EXAM:    General: NAD, comfortable  HEENT: NCAT, PERRL, clear conjunctiva, no scleral icterus  Neck: supple, no JVD  Respiratory: CTA b/l, no wheezing, rhonchi, rales  Cardiovascular: RRR, normal S1S2, no M/R/G  Abdomen: soft, NT/ND, bowel sounds in all four quadrants, no palpable masses  Extremities: WWP, no clubbing, cyanosis, or edema  Neuro: AOx3    MEDICATIONS:  MEDICATIONS  (STANDING):  cefTRIAXone   IVPB      dextrose 5%. 1000 milliLiter(s) (50 mL/Hr) IV Continuous <Continuous>  dextrose 50% Injectable 12.5 Gram(s) IV Push once  dextrose 50% Injectable 25 Gram(s) IV Push once  dextrose 50% Injectable 25 Gram(s) IV Push once  heparin  Injectable 5000 Unit(s) SubCutaneous every 8 hours  insulin lispro (HumaLOG) corrective regimen sliding scale   SubCutaneous Before meals and at bedtime  methylPREDNISolone sodium succinate Injectable 40 milliGRAM(s) IV Push every 8 hours  metroNIDAZOLE  IVPB 500 milliGRAM(s) IV Intermittent every 8 hours  pantoprazole    Tablet 40 milliGRAM(s) Oral before breakfast  sodium chloride 0.9%. 1000 milliLiter(s) (100 mL/Hr) IV Continuous <Continuous>    MEDICATIONS  (PRN):  dextrose 40% Gel 15 Gram(s) Oral once PRN Blood Glucose LESS THAN 70 milliGRAM(s)/deciliter  glucagon  Injectable 1 milliGRAM(s) IntraMuscular once PRN Glucose LESS THAN 70 milligrams/deciliter  morphine  - Injectable 2 milliGRAM(s) IV Push every 6 hours PRN Severe Pain (7 - 10)  ondansetron Injectable 4 milliGRAM(s) IV Push every 6 hours PRN Nausea      ALLERGIES:  Allergies    No Known Allergies    Intolerances        LABS:                        13.7   6.6   )-----------( 227      ( 08 Jun 2018 07:08 )             42.4     06-08    135  |  98  |  14  ----------------------------<  115<H>  4.3   |  25  |  1.08    Ca    9.5      08 Jun 2018 07:08  Phos  3.2     06-08  Mg     2.1     06-08    TPro  7.7  /  Alb  4.0  /  TBili  0.8  /  DBili  x   /  AST  59<H>  /  ALT  59<H>  /  AlkPhos  84  06-08    PT/INR - ( 08 Jun 2018 07:08 )   PT: 10.0 sec;   INR: 0.90          PTT - ( 08 Jun 2018 07:08 )  PTT:35.2 sec      RADIOLOGY & ADDITIONAL TESTS: Studies reviewed. INTERVAL HPI/OVERNIGHT EVENTS:    OVERNIGHT: No overnight events.  SUBJECTIVE: Patient seen and examined at bedside.     IVETTE is a 43 y/o man with a PMH of Crohn Disease and hepatitis C (from tattoo needles) who presented to the ED with three days of severe RLQ abdominal pain. His pain began a few days ago as a sharp pain in his RLQ with occasional "lightening strikes" where he feels the pain diffusely around his abdomen, back, and esophagus. This radiation pattern is new for him. The pain has been constant since the onset but the severity  waxes and wanes in waves. He feels like there is "something stuck" in his RLQ. He also reports associated sx of nausea, vomiting, and diarrhea. He also has felt the chills and night sweats. The diarrhea occurs about every two hours (LBM last night) and is watery with mucus and sometimes bright red blood. He also said that he's been feeling lightheaded, which he attributes to dehydration from the diarrhea, and has episodes of "fading" where he looses consciousness and falls. He was hospitalized at [facility] three weeks ago for similar sx and is very frustrated that he keeps being hospitalized and can't live his life. He has had Crohn's since Sep 2007 and has tried multiple medication regimens with no success. He feels that the anti-inflammatory drugs (eg Pentasa) work best for him but he can't afford them. He tried Humira but it didn't really help his sx. He is currently on steroids but wants to be done with them. His GI doctor is Anh Squires at [facility]. He was supposed to come in for an appointment but couldn't make it due to the pain.      PMH  Crohn disease  Hep C    PSH  Ankle repair 2014  C2 disc repair 2012    FHx  mother, father, and sister are healthy.    Meds    Allergies  None    Social  Previously light smoker but quit after diagnosis with CD in 2007. 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 Uses protection most of the time (not if it's a relationship)    ROS:  General: +night sweats, + chills, + sensitivity to light (attributed to lack of sleep) + occasional subjective LAD  CV: Denies chest pain  Resp: Has a cough most mornings, which he attributes to the second hand smoke  GI: + N/V +watery diarrhea with mucus and occasional blood. + difficulty swallowing  : Denies dysuria  MSK: + weakness   Psych: + feelings of depression    OBJECTIVE:    VITAL SIGNS:  ICU Vital Signs Last 24 Hrs  T(C): 37.2 (08 Jun 2018 05:11), Max: 37.2 (08 Jun 2018 05:11)  T(F): 98.9 (08 Jun 2018 05:11), Max: 98.9 (08 Jun 2018 05:11)  HR: 72 (08 Jun 2018 05:11) (72 - 116)  BP: 103/71 (08 Jun 2018 05:11) (103/71 - 127/82)  BP(mean): --  ABP: --  ABP(mean): --  RR: 18 (08 Jun 2018 05:11) (17 - 18)  SpO2: 97% (08 Jun 2018 05:11) (95% - 97%)        06-07 @ 07:01  -  06-08 @ 07:00  --------------------------------------------------------  IN: 350 mL / OUT: 0 mL / NET: 350 mL      CAPILLARY BLOOD GLUCOSE      POCT Blood Glucose.: 136 mg/dL (08 Jun 2018 08:18)      PHYSICAL EXAM:    General: NAD, uncomfortable and in pain  HEENT: NCAT, PERRL, clear conjunctiva, no scleral icterus  Neck: supple, no JVD,   Respiratory: CTA b/l, no wheezing, rhonchi, rales  Cardiovascular: RRR, normal S1S2, no M/R/G  Abdomen: distended, pain to palpation in all quadrants, worse in RLQ. + guarding but no rebound  Extremities: WWP, no clubbing, cyanosis, or edema, 2+ pulses b/l  Skin: no jaundice  Neuro: AOx3, CN III-XII intact    MEDICATIONS:  MEDICATIONS  (STANDING):  cefTRIAXone   IVPB      dextrose 5%. 1000 milliLiter(s) (50 mL/Hr) IV Continuous <Continuous>  dextrose 50% Injectable 12.5 Gram(s) IV Push once  dextrose 50% Injectable 25 Gram(s) IV Push once  dextrose 50% Injectable 25 Gram(s) IV Push once  heparin  Injectable 5000 Unit(s) SubCutaneous every 8 hours  insulin lispro (HumaLOG) corrective regimen sliding scale   SubCutaneous Before meals and at bedtime  methylPREDNISolone sodium succinate Injectable 40 milliGRAM(s) IV Push every 8 hours  metroNIDAZOLE  IVPB 500 milliGRAM(s) IV Intermittent every 8 hours  pantoprazole    Tablet 40 milliGRAM(s) Oral before breakfast  sodium chloride 0.9%. 1000 milliLiter(s) (100 mL/Hr) IV Continuous <Continuous>    MEDICATIONS  (PRN):  dextrose 40% Gel 15 Gram(s) Oral once PRN Blood Glucose LESS THAN 70 milliGRAM(s)/deciliter  glucagon  Injectable 1 milliGRAM(s) IntraMuscular once PRN Glucose LESS THAN 70 milligrams/deciliter  morphine  - Injectable 2 milliGRAM(s) IV Push every 6 hours PRN Severe Pain (7 - 10)  ondansetron Injectable 4 milliGRAM(s) IV Push every 6 hours PRN Nausea      ALLERGIES:  Allergies    No Known Allergies    Intolerances        LABS:                        13.7   6.6   )-----------( 227      ( 08 Jun 2018 07:08 )             42.4     06-08    135  |  98  |  14  ----------------------------<  115<H>  4.3   |  25  |  1.08    Ca    9.5      08 Jun 2018 07:08  Phos  3.2     06-08  Mg     2.1     06-08    TPro  7.7  /  Alb  4.0  /  TBili  0.8  /  DBili  x   /  AST  59<H>  /  ALT  59<H>  /  AlkPhos  84  06-08    PT/INR - ( 08 Jun 2018 07:08 )   PT: 10.0 sec;   INR: 0.90          PTT - ( 08 Jun 2018 07:08 )  PTT:35.2 sec      RADIOLOGY & ADDITIONAL TESTS: Studies reviewed.

## 2018-06-08 NOTE — MEDICAL STUDENT PROGRESS NOTE(EDUCATION) - NS MD HP STUD ASPLAN PLAN FT
1.	Crohn's colitis: Pt presented with RLQ pain and diarrhea with hematochezia. He was discharged from [facility] after a similar episode 3 weeks ago on antibiotics and a prednisone taper. He was not able to comply with Pentasa therapy as it is too expensive. His sx began about three days ago and have gotten worse. His CT and X-rays were consistent with a mild small bowel obstruction.  1.	consult with GI  2.	surgery consulted but states that surgery is not indicated  3.	check for C-diff, o&p, culture  4.	empiric abx  5.	follow AXRs    2.    Hepatitis C virus infection.  Plan: noncompliant with GI f/u and treatment.          No s/s or e/o liver disease.           - GI input in AM          - will check coags in AM (t-bili WNL and transaminases slightly elevated)          - consider RUQ u/s.       3.    Transaminitis.  Plan: AST/ALT slightly elevated. likely from chronic hep C infection (untreated).            - can check RUQ u/s to r/o nodules/cirrhosis but not likely.

## 2018-06-08 NOTE — PATIENT PROFILE ADULT. - NS TRANSFER PATIENT BELONGINGS
Cell Phone/PDA (specify)/Clothing/Cell phone  Electronic Device (specify)/Cell Phone/PDA (specify)/Jewelry/Money (specify)/Clothing/Cell phone

## 2018-06-08 NOTE — H&P ADULT - PROBLEM SELECTOR PLAN 2
noncompliant with GI f/u and treatment.   No s/s or e/o liver disease.   - GI input in AM  - will check coags in AM (t-bili WNL and transaminases slightly elevated)  - consider RUQ u/s

## 2018-06-08 NOTE — CONSULT NOTE ADULT - SUBJECTIVE AND OBJECTIVE BOX
HPI:  41 YO M poor historian with PMHx Crohn's disease (Dx 9/2007) and chronic hepatitis C (no prior treatment) 2/2 tattoo ink needles and alcohol use p/w diarrhea, abdominal pain, vomiting, and joint pain x 4 days. Pt states    . Patient has a longstanding history of Crohn's disease. He has never had a consistent Gastroenterologist to follow up with. He has been on Humira but said 'it didn't work,' and most recently he was supposed to be on Pentasa but has not been compliant because it was too expensive for him to afford. He has multiple admissions for abdominal pain 2/2 Crohn's flare. His last admission was at [place] where he was hospitalized for 1 week and was d/c 2 weeks ago. He was given antibx and steroids (d/c on Prednisone taper). Four days ago, he began having his 'usual Crohn's symptoms.' He endorses decreased appetite, abdominal pain that was sharp and radiating everywhere (mostly RLQ), and multiple episodes of diarrhea with blood streaks. He also felt sweaty but did not check his temperature. Has also had nausea and vomiting and cannot keep food down. He was supposed to see a GI doctor yesterday but couldn't make it due to severe pain.   He does not know when his last colonoscopy was.     No chills, sob, cp or recent travel sick contacts or exotic foods.     In ED VSS except tachycardia to around 110-120.     Given Cipro and Flagyl.     CT a/p performed and showed " Stigmata of chronic Crohn's disease. 5 cm segment of mild wall thickening and mucosal enhancement of the terminal ileum suspicious for mild acute inflammation. A few mildly dilated loops of small bowel with possible segments of small bowel luminal narrowing. Cannot exclude mild/low grade obstruction. Consider follow-up radiographs to evaluate progression of oral contrast to colon."    Surgery consulted and recommended only medical management and AXR in AM. (08 Jun 2018 02:01)    Allergies    No Known Allergies    Intolerances      MEDICATIONS:  MEDICATIONS  (STANDING):  cefTRIAXone   IVPB      dextrose 5%. 1000 milliLiter(s) (50 mL/Hr) IV Continuous <Continuous>  dextrose 50% Injectable 12.5 Gram(s) IV Push once  dextrose 50% Injectable 25 Gram(s) IV Push once  dextrose 50% Injectable 25 Gram(s) IV Push once  heparin  Injectable 5000 Unit(s) SubCutaneous every 8 hours  insulin lispro (HumaLOG) corrective regimen sliding scale   SubCutaneous Before meals and at bedtime  methylPREDNISolone sodium succinate Injectable 40 milliGRAM(s) IV Push every 8 hours  metroNIDAZOLE  IVPB 500 milliGRAM(s) IV Intermittent every 8 hours  pantoprazole    Tablet 40 milliGRAM(s) Oral before breakfast  sodium chloride 0.9%. 1000 milliLiter(s) (100 mL/Hr) IV Continuous <Continuous>    MEDICATIONS  (PRN):  dextrose 40% Gel 15 Gram(s) Oral once PRN Blood Glucose LESS THAN 70 milliGRAM(s)/deciliter  glucagon  Injectable 1 milliGRAM(s) IntraMuscular once PRN Glucose LESS THAN 70 milligrams/deciliter  morphine  - Injectable 2 milliGRAM(s) IV Push every 6 hours PRN Severe Pain (7 - 10)  ondansetron Injectable 4 milliGRAM(s) IV Push every 6 hours PRN Nausea    PAST MEDICAL & SURGICAL HISTORY:  Hepatitis C  Crohn disease  No significant past surgical history    FAMILY HISTORY:  No pertinent family history in first degree relatives    SOCIAL HISTORY:  Tobac[place]: [ ] Current, [ ] Former, [ ] Never; Pack Years:  Alcohol:  Illicit Drugs:    REVIEW OF SYSTEMS:  Constitutional: No fever, chills, weight loss, or fatigue  ENMT:  No visual changes; No difficulty hearing, tinnitus, vertigo; No sinus or throat pain  Neck: No pain or stiffness  Respiratory: No cough, wheezing, or hemoptysis; No shortness of breath  Cardiovascular: No chest pain, palpitations, dizziness, orthopnea, PND, or leg swelling  Gastrointestinal: No abdominal or epigastric pain. No  nausea, vomiting, or hematemesis. No diarrhea, constipation, or steatorrhea. No melena or hematochezia  Genitourinary: No dysuria, urinary frequency/hesitancy, or hematuria  Skin: No itching, burning, rashes or lesions   Musculoskeletal: No joint pain or swelling; No muscle, back or extremity pain    Vital Signs Last 24 Hrs  T(C): 36.6 (08 Jun 2018 08:47), Max: 37.2 (08 Jun 2018 05:11)  T(F): 97.9 (08 Jun 2018 08:47), Max: 98.9 (08 Jun 2018 05:11)  HR: 76 (08 Jun 2018 08:47) (72 - 116)  BP: 132/85 (08 Jun 2018 08:47) (103/71 - 132/85)  BP(mean): --  RR: 18 (08 Jun 2018 08:47) (17 - 18)  SpO2: 95% (08 Jun 2018 08:47) (95% - 97%)    06-07 @ 07:01  -  06-08 @ 07:00  --------------------------------------------------------  IN: 350 mL / OUT: 0 mL / NET: 350 mL        PHYSICAL EXAM:    General: Well developed; well nourished; in no acute distress  HEENT: MMM, conjunctiva and sclera clear  Gastrointestinal: Soft, non-tender non-distended; Normal bowel sounds; No rebound or guarding  Extremities: Normal range of motion, No clubbing, cyanosis or edema  Neurological: Alert and oriented x3  Skin: Warm and dry. No obvious rash    LABS:                        13.7   6.6   )-----------( 227      ( 08 Jun 2018 07:08 )             42.4     06-08    135  |  98  |  14  ----------------------------<  115<H>  4.3   |  25  |  1.08    Ca    9.5      08 Jun 2018 07:08  Phos  3.2     06-08  Mg     2.1     06-08    TPro  7.7  /  Alb  4.0  /  TBili  0.8  /  DBili  x   /  AST  59<H>  /  ALT  59<H>  /  AlkPhos  84  06-08        RADIOLOGY & ADDITIONAL STUDIES: HPI:  43 YO M poor historian with PMHx Crohn's disease (Dx 9/2007) and chronic hepatitis C (no prior treatment) 2/2 tattoo ink needles and alcohol use p/w diarrhea, abdominal pain, vomiting, and joint pain x 4 days. Pt states he first presented with vomiting, diarrhea, abdominal pain, and joint pain and was diagnosed via colonoscopy 9/2017 with Crohn's disease involving the terminal ileum. At the time he was treated with steroids, antibiotics, and Pentasa. He states that since then he has also been treated with Humira for approximately 3 months with maintenance of his symptoms, however, at the time he was traveling between [facility] and had two different gastroenterologist, and eventually he was no longer prescribed Humira. In the last year, the pt states that he has had 6 flares. He reports a history of intra-abdominal abscesses that required drainage, however, denies fistulas. He was recently admitted to [place] 3 weeks ago for 7 days and had a colonoscopy that showed TI involvement and small ulcers throughout his colon, he was prescribed steroids, pentasa, and antibiotics, however, he was unable to fill Pentasa d/t expense. Pt states that he was tapering down there steroid, when his symptoms started to recur 4 days ago. Pt reports RLQ pain that radiates to the left and back, denies rash, oral ulcers, fever, nausea, vomiting, melena, hematochezia.     He is followed by Dr. Anh Hodges for the last two months, but has missed appointments d/t flares.     Allergies    No Known Allergies    Intolerances      MEDICATIONS:  MEDICATIONS  (STANDING):  cefTRIAXone   IVPB      dextrose 5%. 1000 milliLiter(s) (50 mL/Hr) IV Continuous <Continuous>  dextrose 50% Injectable 12.5 Gram(s) IV Push once  dextrose 50% Injectable 25 Gram(s) IV Push once  dextrose 50% Injectable 25 Gram(s) IV Push once  heparin  Injectable 5000 Unit(s) SubCutaneous every 8 hours  insulin lispro (HumaLOG) corrective regimen sliding scale   SubCutaneous Before meals and at bedtime  methylPREDNISolone sodium succinate Injectable 40 milliGRAM(s) IV Push every 8 hours  metroNIDAZOLE  IVPB 500 milliGRAM(s) IV Intermittent every 8 hours  pantoprazole    Tablet 40 milliGRAM(s) Oral before breakfast  sodium chloride 0.9%. 1000 milliLiter(s) (100 mL/Hr) IV Continuous <Continuous>    MEDICATIONS  (PRN):  dextrose 40% Gel 15 Gram(s) Oral once PRN Blood Glucose LESS THAN 70 milliGRAM(s)/deciliter  glucagon  Injectable 1 milliGRAM(s) IntraMuscular once PRN Glucose LESS THAN 70 milligrams/deciliter  morphine  - Injectable 2 milliGRAM(s) IV Push every 6 hours PRN Severe Pain (7 - 10)  ondansetron Injectable 4 milliGRAM(s) IV Push every 6 hours PRN Nausea    PAST MEDICAL & SURGICAL HISTORY:  Hepatitis C  Crohn disease  No significant past surgical history    FAMILY HISTORY:  No pertinent family history in first degree relatives    SOCIAL HISTORY:  Tobac[place]: [ ] Current, [ ] Former, [ ] Never; Pack Years:  Alcohol:  Illicit Drugs:    REVIEW OF SYSTEMS:  Constitutional: No fever, chills, weight loss, or fatigue  ENMT:  No visual changes; No difficulty hearing, tinnitus, vertigo; No sinus or throat pain  Neck: No pain or stiffness  Respiratory: No cough, wheezing, or hemoptysis; No shortness of breath  Cardiovascular: No chest pain, palpitations, dizziness, orthopnea, PND, or leg swelling  Gastrointestinal: No abdominal or epigastric pain. No  nausea, vomiting, or hematemesis. No diarrhea, constipation, or steatorrhea. No melena or hematochezia  Genitourinary: No dysuria, urinary frequency/hesitancy, or hematuria  Skin: No itching, burning, rashes or lesions   Musculoskeletal: No joint pain or swelling; No muscle, back or extremity pain    Vital Signs Last 24 Hrs  T(C): 36.6 (08 Jun 2018 08:47), Max: 37.2 (08 Jun 2018 05:11)  T(F): 97.9 (08 Jun 2018 08:47), Max: 98.9 (08 Jun 2018 05:11)  HR: 76 (08 Jun 2018 08:47) (72 - 116)  BP: 132/85 (08 Jun 2018 08:47) (103/71 - 132/85)  BP(mean): --  RR: 18 (08 Jun 2018 08:47) (17 - 18)  SpO2: 95% (08 Jun 2018 08:47) (95% - 97%)    06-07 @ 07:01  -  06-08 @ 07:00  --------------------------------------------------------  IN: 350 mL / OUT: 0 mL / NET: 350 mL        PHYSICAL EXAM:    General: Well developed; well nourished; in no acute distress  HEENT: MMM, conjunctiva and sclera clear  Gastrointestinal: Soft, non-tender non-distended; Normal bowel sounds; No rebound or guarding  Extremities: Normal range of motion, No clubbing, cyanosis or edema  Neurological: Alert and oriented x3  Skin: Warm and dry. No obvious rash    LABS:                        13.7   6.6   )-----------( 227      ( 08 Jun 2018 07:08 )             42.4     06-08    135  |  98  |  14  ----------------------------<  115<H>  4.3   |  25  |  1.08    Ca    9.5      08 Jun 2018 07:08  Phos  3.2     06-08  Mg     2.1     06-08    TPro  7.7  /  Alb  4.0  /  TBili  0.8  /  DBili  x   /  AST  59<H>  /  ALT  59<H>  /  AlkPhos  84  06-08        RADIOLOGY & ADDITIONAL STUDIES: HPI:  41 YO M poor historian with PMHx Crohn's disease (Dx 9/2007) and chronic hepatitis C (no prior treatment) 2/2 tattoo ink needles and alcohol use p/w diarrhea, abdominal pain, vomiting, and joint pain x 4 days. Pt states he first presented with vomiting, diarrhea, abdominal pain, and joint pain and was diagnosed via colonoscopy 9/2017 with Crohn's disease involving the terminal ileum. At the time he was treated with steroids, antibiotics, and Pentasa. He states that since then he has also been treated with Humira for approximately 3 months with maintenance of his symptoms, however, at the time he was traveling between [facility] and had two different gastroenterologist, and eventually he was no longer prescribed Humira. In the last year, the pt states that he has had 6 flares. He reports a history of intra-abdominal abscesses that required drainage, however, denies fistulas. He was recently admitted to [place] 3 weeks ago for 7 days and had a colonoscopy that showed TI involvement and small ulcers throughout his colon, he was prescribed steroids, pentasa, and antibiotics, however, he was unable to fill Pentasa d/t expense. Pt states that he was tapering down there steroid, when his symptoms started to recur 4 days ago. Pt reports RLQ pain that radiates to the left and back, denies rash, oral ulcers, fever, nausea, vomiting, melena, hematochezia.     He is followed by Dr. Anh Hodges for the last two months, but has missed appointments d/t flares.     Allergies    No Known Allergies    Intolerances    MEDICATIONS:  MEDICATIONS  (STANDING):  cefTRIAXone   IVPB      dextrose 5%. 1000 milliLiter(s) (50 mL/Hr) IV Continuous <Continuous>  dextrose 50% Injectable 12.5 Gram(s) IV Push once  dextrose 50% Injectable 25 Gram(s) IV Push once  dextrose 50% Injectable 25 Gram(s) IV Push once  heparin  Injectable 5000 Unit(s) SubCutaneous every 8 hours  insulin lispro (HumaLOG) corrective regimen sliding scale   SubCutaneous Before meals and at bedtime  methylPREDNISolone sodium succinate Injectable 40 milliGRAM(s) IV Push every 8 hours  metroNIDAZOLE  IVPB 500 milliGRAM(s) IV Intermittent every 8 hours  pantoprazole    Tablet 40 milliGRAM(s) Oral before breakfast  sodium chloride 0.9%. 1000 milliLiter(s) (100 mL/Hr) IV Continuous <Continuous>    MEDICATIONS  (PRN):  dextrose 40% Gel 15 Gram(s) Oral once PRN Blood Glucose LESS THAN 70 milliGRAM(s)/deciliter  glucagon  Injectable 1 milliGRAM(s) IntraMuscular once PRN Glucose LESS THAN 70 milligrams/deciliter  morphine  - Injectable 2 milliGRAM(s) IV Push every 6 hours PRN Severe Pain (7 - 10)  ondansetron Injectable 4 milliGRAM(s) IV Push every 6 hours PRN Nausea    PAST MEDICAL & SURGICAL HISTORY:  Hepatitis C  Crohn disease  No significant past surgical history    FAMILY HISTORY:  No pertinent family history in first degree relatives  NO significant family history of CRC, gastric CA, liver disease, or IBD    SOCIAL HISTORY:  Tobacoo: Former  Alcohol: 1 beer every few weeks, however, drinks 40 oz during flares to sleep  Illicit Drugs: Occasional THC    REVIEW OF SYSTEMS: per HPI    Vital Signs Last 24 Hrs  T(C): 36.6 (08 Jun 2018 08:47), Max: 37.2 (08 Jun 2018 05:11)  T(F): 97.9 (08 Jun 2018 08:47), Max: 98.9 (08 Jun 2018 05:11)  HR: 76 (08 Jun 2018 08:47) (72 - 116)  BP: 132/85 (08 Jun 2018 08:47) (103/71 - 132/85)  BP(mean): --  RR: 18 (08 Jun 2018 08:47) (17 - 18)  SpO2: 95% (08 Jun 2018 08:47) (95% - 97%)    06-07 @ 07:01  -  06-08 @ 07:00  --------------------------------------------------------  IN: 350 mL / OUT: 0 mL / NET: 350 mL    PHYSICAL EXAM:    General: Well developed; well nourished; in no acute distress  HEENT: MMM, conjunctiva and sclera clear, no oral ulcers  Gastrointestinal: Soft, RLQ TTP non-distended; No rebound or guarding; No spider angiomas, caput medusae, or palmar erythema  Extremities: Normal range of motion, No clubbing, cyanosis or edema  Neurological: Alert and oriented x3  Skin: Warm and dry. No obvious rash    LABS:                        13.7   6.6   )-----------( 227      ( 08 Jun 2018 07:08 )             42.4     06-08    135  |  98  |  14  ----------------------------<  115<H>  4.3   |  25  |  1.08    Ca    9.5      08 Jun 2018 07:08  Phos  3.2     06-08  Mg     2.1     06-08    TPro  7.7  /  Alb  4.0  /  TBili  0.8  /  DBili  x   /  AST  59<H>  /  ALT  59<H>  /  AlkPhos  84  06-08      RADIOLOGY & ADDITIONAL STUDIES:     CT Abdomen and Pelvis w/ Oral Cont and w/ IV Cont (06.07.18 @ 18:14)  FINDINGS:    Lower chest: Mild linear atelectasis.    Liver: Mild hepatic steatosis.. No focal mass. Portal and hepatic veins   are patent.    Biliary system: Gallbladder is contracted. No calcified gallstones. No   biliary ductal dilatation.    Pancreas: Unremarkable.    Spleen: Unremarkable.    Adrenal glands: Unremarkable.    Kidneys: Symmetric parenchymal enhancement. No renal mass. No   hydronephrosis. No renal or ureteral stone.     Urinary Bladder: Unremarkable.    Reproductive organs: The prostate and seminal vesicles are normal in   appearance.     Bowel/Peritoneum: Ingested contrast seen reaching the distal small bowel.   Unusual anatomy of the small and large bowel is again noted with   descending colon is again noted to be medial to small bowel loops. There   is dilation of the proximal jejunum measuring up to 3.6 cm in diameter.   No discrete transition point are identified to suggest high-grade small   bowel obstruction. There are also interspersed areas of luminal   narrowing, which may represent partial strictures, unchanged. There is   marked submucosal fatty deposition, consistent with chronic inflammatory   bowel disease with pattern of Crohn's disease. There is a short segment   of mild wall thickening and increased mucosal enhancement in the distal   ileum, about 7 cm proximal to the ileocecal valve. This segment measures   about 5 cm in length.  No ascites or extraluminal gas.     Lymph nodes: No lymphadenopathy.    Aorta/IVC: Normal caliber. Retroaortic left renalvein.    Abdominal wall: Diastases of the rectus abdominus muscles at the level of   the umbilicus..    Bones/Soft tissues: No acute abnormality.        IMPRESSION:     Stigmata of chronic Crohn's disease.    5 cm segment of mild wall thickening and mucosal enhancement of the   terminal ileum suspicious for mild acute inflammation.    A few mildly dilated loops of small bowel with possible segments of small   bowel luminal narrowing. Cannot exclude mild/low grade obstruction.   Consider follow-upradiographs to evaluate progression of oral contrast   to colon.    Xray Abdomen 1 View PORTABLE -Routine (06.08.18 @ 11:44)  Single view of the abdomen is submitted compared to the previous   examination of June 7, 2018 at 9:06 PM. At this time contrast has moved   into the distal colon and rectum. No free air is identified.      Impression: Progression of contrast into the colon.

## 2018-06-08 NOTE — H&P ADULT - NSHPREVIEWOFSYSTEMS_GEN_ALL_CORE
REVIEW OF SYSTEMS:    CONSTITUTIONAL: +weakness, fevers , no chills  EYES/ENT: No visual changes;  No vertigo or throat pain   NECK: No pain or stiffness  RESPIRATORY: No cough, wheezing, hemoptysis; No shortness of breath  CARDIOVASCULAR: No chest pain or palpitations  GASTROINTESTINAL: +abdominal pain, + nausea, vomiting, no hematemesis; + diarrhea and hematochezia.  GENITOURINARY: No dysuria, frequency or hematuria  NEUROLOGICAL: No numbness or weakness  SKIN: No itching, burning, rashes, or lesions   All other review of systems is negative unless indicated above.

## 2018-06-08 NOTE — PROGRESS NOTE ADULT - SUBJECTIVE AND OBJECTIVE BOX
SUBJECTIVE: Pt seen and examined at bedside. No overnight events.   Patient reports 3 episodes of watery/mucous diarrhea overnight, complaints of crampy abdominal pain, mild nausea but no emesis.  Denies chest pain, SOB, palpitations, chills, fevers or dizziness  Remains hemodynamically stable and afebrile.    Vital Signs Last 24 Hrs  T(C): 36.6 (08 Jun 2018 08:47), Max: 37.2 (08 Jun 2018 05:11)  T(F): 97.9 (08 Jun 2018 08:47), Max: 98.9 (08 Jun 2018 05:11)  HR: 76 (08 Jun 2018 08:47) (72 - 116)  BP: 132/85 (08 Jun 2018 08:47) (103/71 - 132/85)  BP(mean): --  RR: 18 (08 Jun 2018 08:47) (17 - 18)  SpO2: 95% (08 Jun 2018 08:47) (95% - 97%)    PHYSICAL EXAM  In no acute distress, resting in bed comfortably  Pulm: CTAB, no respiratory distress, nonlabored breathing on RA  C/V: RRR, no MRG  Abd: Soft, mildly distended, moderate diffuse tenderness to palpation mostly in RLQ with some voluntary guarding, no rebound  Extrem: WWP, no edema, nontender. No cyanosis, pallor. Palpable radial, PT bilaterally  Skin: No rashes noted  Psych: normal affect, responds appropriately to questions      LABS:                        13.7   6.6   )-----------( 227      ( 08 Jun 2018 07:08 )             42.4     06-08    135  |  98  |  14  ----------------------------<  115<H>  4.3   |  25  |  1.08    Ca    9.5      08 Jun 2018 07:08  Phos  3.2     06-08  Mg     2.1     06-08    TPro  7.7  /  Alb  4.0  /  TBili  0.8  /  DBili  x   /  AST  59<H>  /  ALT  59<H>  /  AlkPhos  84  06-08    PT/INR - ( 08 Jun 2018 07:08 )   PT: 10.0 sec;   INR: 0.90          PTT - ( 08 Jun 2018 07:08 )  PTT:35.2 sec      ASSESSMENT AND PLAN  42yoM with PMH HCV, EtOH abuse, Crohn's disease recently hospitalized for a flare and discharged on a steroid taper and Pentasa, presents to the ED with 4-day history of abdominal pain, diarrhea, and emesis    No surgical intervention indicated at this time  Recommend medical/GI consult for Crohn's management, concern for terminal ileitis  If pt develops obstructive symptoms, please call Surgery for evaluation and possible NGT placement.   Please repeat AXR today   Will follow on Team 4C. Please call with any questions  Discussed with chief resident SUBJECTIVE: Pt seen and examined at bedside. No overnight events.   Patient reports 3 episodes of watery/mucous diarrhea overnight, complaints of crampy abdominal pain, mild nausea but no emesis.  Denies chest pain, SOB, palpitations, chills, fevers or dizziness  Remains hemodynamically stable and afebrile.    Vital Signs Last 24 Hrs  T(C): 36.6 (08 Jun 2018 08:47), Max: 37.2 (08 Jun 2018 05:11)  T(F): 97.9 (08 Jun 2018 08:47), Max: 98.9 (08 Jun 2018 05:11)  HR: 76 (08 Jun 2018 08:47) (72 - 116)  BP: 132/85 (08 Jun 2018 08:47) (103/71 - 132/85)  BP(mean): --  RR: 18 (08 Jun 2018 08:47) (17 - 18)  SpO2: 95% (08 Jun 2018 08:47) (95% - 97%)    PHYSICAL EXAM  In no acute distress, resting in bed comfortably  Pulm: CTAB, no respiratory distress, nonlabored breathing on RA  C/V: RRR, no MRG  Abd: Soft, mildly distended, moderate diffuse tenderness to palpation mostly in RLQ with some voluntary guarding, no rebound  Extrem: WWP, no edema, nontender. No cyanosis, pallor. Palpable radial, PT bilaterally  Skin: No rashes noted  Psych: normal affect, responds appropriately to questions      LABS:                        13.7   6.6   )-----------( 227      ( 08 Jun 2018 07:08 )             42.4     06-08    135  |  98  |  14  ----------------------------<  115<H>  4.3   |  25  |  1.08    Ca    9.5      08 Jun 2018 07:08  Phos  3.2     06-08  Mg     2.1     06-08    TPro  7.7  /  Alb  4.0  /  TBili  0.8  /  DBili  x   /  AST  59<H>  /  ALT  59<H>  /  AlkPhos  84  06-08    PT/INR - ( 08 Jun 2018 07:08 )   PT: 10.0 sec;   INR: 0.90          PTT - ( 08 Jun 2018 07:08 )  PTT:35.2 sec      ASSESSMENT AND PLAN  42yoM with PMH HCV, EtOH abuse, Crohn's disease recently hospitalized for a flare and discharged on a steroid taper and Pentasa, presents to the ED with 4-day history of abdominal pain, diarrhea, and emesis    No surgical intervention indicated at this time  Recommend medical/GI consult for Crohn's management, concern for terminal ileitis  If pt develops obstructive symptoms, please call Surgery for evaluation and possible NGT placement.   Please repeat AXR today   Please obtain colonoscopy/EGD records from [facility]. We will discuss with patient possible elective surgical intervention after defining stricture anatomy.   Will follow on Team 4C. Please call with any questions  Discussed with chief resident

## 2018-06-08 NOTE — H&P ADULT - ASSESSMENT
Mr. Zaidi is a 42 yr old male poor historian with PMHx Crohn's disease with multiple admissions for flares and chronic hepatitis C from tattoo ink needles and alcohol use presents to [facility] ED with 4 days of abdominal pain, diarrhea and hematochezia, likely 2/2 Crohn's flare.

## 2018-06-08 NOTE — H&P ADULT - NSHPLABSRESULTS_GEN_ALL_CORE
LABS:                         14.6   5.8   )-----------( 266      ( 07 Jun 2018 15:57 )             43.5     06-07    138  |  98  |  10  ----------------------------<  128<H>  4.0   |  27  |  1.04    Ca    10.3      07 Jun 2018 15:57    TPro  8.1  /  Alb  4.4  /  TBili  0.5  /  DBili  x   /  AST  74<H>  /  ALT  68<H>  /  AlkPhos  92  06-07        RADIOLOGY, EKG & ADDITIONAL TESTS: Reviewed.

## 2018-06-08 NOTE — CONSULT NOTE ADULT - SUBJECTIVE AND OBJECTIVE BOX
HPI:  42yoM with PMH Crohn's (2007), Hepatitis C, EtOH abuse, presents to the ED with 4-day history of abdominal pain, bloody diarrhea, and recent emesis. He states that he has recently had multiple Crohn's flares and was admitted to [facility] just three weeks ago, discharged on PO steroid taper and Pentasa, though has not been fully adherent to the regimen. He denies fevers, though reports chills and general feeling of illness. Emesis x2 yesterday, clear/recently ingested fluids. +flatus, +diarrhea, intermittently bloody.    Had previously been trialed on Remicade and Pentasa though with poor compliance and has hx of leaving AMA during some hospitalizations. Reports drinking two 40oz. beers before bed every night, more when he's in worse pain during flares. Prior smoking history, quit in 2007 with Crohn's diagnosis. 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    No prior abdominal surgeries.  No medications aside from steroids and Pentasa    PAST MEDICAL & SURGICAL HISTORY:  Crohn disease  Crohn's disease    No Known Allergies    Vital Signs Last 24 Hrs  T(C): 36.4 (08 Jun 2018 01:03), Max: 36.8 (07 Jun 2018 15:12)  T(F): 97.6 (08 Jun 2018 01:03), Max: 98.2 (07 Jun 2018 15:12)  HR: 92 (08 Jun 2018 01:03) (92 - 116)  BP: 114/80 (08 Jun 2018 01:03) (114/80 - 127/82)  BP(mean): --  RR: 18 (08 Jun 2018 01:03) (18 - 18)  SpO2: 97% (08 Jun 2018 01:03) (96% - 97%)    PHYSICAL EXAM  Gen: NAD, resting comfortably in bed. Well developed, well groomed, appears stated age  Neuro: CNII-XII grossly intact. AAOx3. Follows commands  HEENT: PERRL, EOMI, MMM  Pulm: CTAB, no respiratory distress, nonlabored breathing on RA  C/V: RRR, no MRG  Abd: Soft, mildly distended, markedly tender to palpation alanis over RLQ with voluntary guarding. No surgical scars.  Extrem: WWP, no edema, nontender. No cyanosis, pallor. Palpable radial, PT bilaterally  Skin: No rashes noted  Psych: normal affect, responds appropriately to questions      LABS:                        14.6   5.8   )-----------( 266      ( 07 Jun 2018 15:57 )             43.5     06-07    138  |  98  |  10  ----------------------------<  128<H>  4.0   |  27  |  1.04    Ca    10.3      07 Jun 2018 15:57    TPro  8.1  /  Alb  4.4  /  TBili  0.5  /  DBili  x   /  AST  74<H>  /  ALT  68<H>  /  AlkPhos  92  06-07          RADIOLOGY & ADDITIONAL STUDIES:  < from: CT Abdomen and Pelvis w/ Oral Cont and w/ IV Cont (06.07.18 @ 18:14) >  COMPARISON: Multiple CTs of the abdomen and pelvis dating back to   2/25/2010.    FINDINGS:    Lower chest: Mild linear atelectasis.    Liver: Mild hepatic steatosis.. No focal mass. Portal and hepatic veins   are patent.    Biliary system: Gallbladder is contracted. No calcified gallstones. No   biliary ductal dilatation.    Pancreas: Unremarkable.    Spleen: Unremarkable.    Adrenal glands: Unremarkable.    Kidneys: Symmetric parenchymal enhancement. No renal mass. No   hydronephrosis. No renal or ureteral stone.     Urinary Bladder: Unremarkable.    Reproductive organs: The prostate and seminal vesicles are normal in   appearance.     Bowel/Peritoneum: Ingested contrast seen reaching the distal small bowel.   Unusual anatomy of the small and large bowel is again noted with   descending colon is again noted to be medial to small bowel loops. There   is dilation of the proximal jejunum measuring up to 3.6 cm in diameter.   No discrete transition point are identified to suggest high-grade small   bowel obstruction. There are also interspersed areas of luminal   narrowing, which may represent partial strictures, unchanged. There is   marked submucosal fatty deposition, consistent with chronic inflammatory   bowel disease with pattern of Crohn's disease. There is a short segment   of mild wall thickening and increased mucosal enhancement in the distal   ileum, about 7 cm proximal to the ileocecal valve. This segment measures   about 5 cm in length.  No ascites or extraluminal gas.     Lymph nodes: No lymphadenopathy.    Aorta/IVC: Normal caliber. Retroaortic left renalvein.    Abdominal wall: Diastases of the rectus abdominus muscles at the level of   the umbilicus..    Bones/Soft tissues: No acute abnormality.        IMPRESSION:     Stigmata of chronic Crohn's disease.    5 cm segment of mild wall thickening and mucosal enhancement of the   terminal ileum suspicious for mild acute inflammation.    A few mildly dilated loops of small bowel with possible segments of small   bowel luminal narrowing. Cannot exclude mild/low grade obstruction.   Consider follow-upradiographs to evaluate progression of oral contrast   to colon.      < end of copied text >

## 2018-06-08 NOTE — H&P ADULT - NSHPPHYSICALEXAM_GEN_ALL_CORE
.  VITAL SIGNS:  T(C): 36.5 (06-08-18 @ 01:59), Max: 36.8 (06-07-18 @ 15:12)  T(F): 97.7 (06-08-18 @ 01:59), Max: 98.2 (06-07-18 @ 15:12)  HR: 76 (06-08-18 @ 01:59) (76 - 116)  BP: 126/77 (06-08-18 @ 01:59) (114/80 - 127/82)  BP(mean): --  RR: 17 (06-08-18 @ 01:59) (17 - 18)  SpO2: 95% (06-08-18 @ 01:59) (95% - 97%)  Wt(kg): --    PHYSICAL EXAM:    Constitutional: WDWN, distress due to abdominal pain  Head: NC/AT  Eyes: PERRL, EOMI, anicteric sclera  ENT: no nasal discharge; uvula midline, no oropharyngeal erythema or exudates; dry MM  Neck: supple; no JVD or thyromegaly  Respiratory: CTA B/L; no W/R/R, no retractions  Cardiac: +S1/S2; RRR; no M/R/G; PMI non-displaced  Gastrointestinal: slightly distended, TTP in RLQ, hyperactive BS, + guarding but no rebound. no guerrero's sign.   rectal: deferred  Genitourinary: normal external genitalia  Back: spine midline, no bony tenderness or step-offs; no CVAT B/L  Extremities: WWP, no clubbing or cyanosis; no peripheral edema  Musculoskeletal: NROM x4; no joint swelling, tenderness or erythema  Vascular: 2+ radial, femoral, DP/PT pulses B/L  Dermatologic: skin warm, dry and intact; no rashes, wounds, or scars  Lymphatic: no submandibular or cervical LAD  Neurologic: AAOx3; CNII-XII grossly intact; no focal deficits  Psychiatric: affect and characteristics of appearance, verbalizations, behaviors are appropriate

## 2018-06-08 NOTE — CONSULT NOTE ADULT - ATTENDING COMMENTS
Over 40 admissions for crohn's flares.   Was on humira and sounds like it did help >50%. ?d/c due to insurance issues, but that appears fixed now.   he responds to steroids but flares after tapering off.   I had a conversation with him re; value of getting back on maintenance therapy.  clearly he's frustrated at being ill, but simultaneously seems unmotivated to establish regular care with his GI (apparently within walking dist of his home). We spent out time making the connection between good f/u, adherence to therapy resulting in improved qual of life, dec hospitalizations, and avoiding long term steroid injury.

## 2018-06-08 NOTE — MEDICAL STUDENT PROGRESS NOTE(EDUCATION) - NS MD HP STUD ASPLAN ASSES FT
Mr Zaidi is a 41 y/o man with PMHx of Crohns Disease with multiple recent admissions for CD flares, Hep C from [place] tat[place], presented to [facility] with severe RLQ pain and diarrhea likelt 2/2 a Crohn's flare. His CT showed ileal dilation and mucosal thickening, consistent with CD and his abdominal x-rays show bowel loop dilations and multiple strictures.

## 2018-06-09 LAB
ANION GAP SERPL CALC-SCNC: 12 MMOL/L — SIGNIFICANT CHANGE UP (ref 5–17)
BUN SERPL-MCNC: 13 MG/DL — SIGNIFICANT CHANGE UP (ref 7–23)
CALCIUM SERPL-MCNC: 9.5 MG/DL — SIGNIFICANT CHANGE UP (ref 8.4–10.5)
CHLORIDE SERPL-SCNC: 100 MMOL/L — SIGNIFICANT CHANGE UP (ref 96–108)
CO2 SERPL-SCNC: 24 MMOL/L — SIGNIFICANT CHANGE UP (ref 22–31)
CREAT SERPL-MCNC: 1.01 MG/DL — SIGNIFICANT CHANGE UP (ref 0.5–1.3)
CULTURE RESULTS: SIGNIFICANT CHANGE UP
GLUCOSE BLDC GLUCOMTR-MCNC: 103 MG/DL — HIGH (ref 70–99)
GLUCOSE BLDC GLUCOMTR-MCNC: 107 MG/DL — HIGH (ref 70–99)
GLUCOSE BLDC GLUCOMTR-MCNC: 161 MG/DL — HIGH (ref 70–99)
GLUCOSE BLDC GLUCOMTR-MCNC: 89 MG/DL — SIGNIFICANT CHANGE UP (ref 70–99)
GLUCOSE SERPL-MCNC: 106 MG/DL — HIGH (ref 70–99)
HBV SURFACE AB SER-ACNC: SIGNIFICANT CHANGE UP
HCT VFR BLD CALC: 40.5 % — SIGNIFICANT CHANGE UP (ref 39–50)
HGB BLD-MCNC: 13 G/DL — SIGNIFICANT CHANGE UP (ref 13–17)
MAGNESIUM SERPL-MCNC: 2.2 MG/DL — SIGNIFICANT CHANGE UP (ref 1.6–2.6)
MCHC RBC-ENTMCNC: 28.7 PG — SIGNIFICANT CHANGE UP (ref 27–34)
MCHC RBC-ENTMCNC: 32.1 G/DL — SIGNIFICANT CHANGE UP (ref 32–36)
MCV RBC AUTO: 89.4 FL — SIGNIFICANT CHANGE UP (ref 80–100)
PLATELET # BLD AUTO: 257 K/UL — SIGNIFICANT CHANGE UP (ref 150–400)
POTASSIUM SERPL-MCNC: 4.5 MMOL/L — SIGNIFICANT CHANGE UP (ref 3.5–5.3)
POTASSIUM SERPL-SCNC: 4.5 MMOL/L — SIGNIFICANT CHANGE UP (ref 3.5–5.3)
RBC # BLD: 4.53 M/UL — SIGNIFICANT CHANGE UP (ref 4.2–5.8)
RBC # FLD: 14.2 % — SIGNIFICANT CHANGE UP (ref 10.3–16.9)
SODIUM SERPL-SCNC: 136 MMOL/L — SIGNIFICANT CHANGE UP (ref 135–145)
SPECIMEN SOURCE: SIGNIFICANT CHANGE UP
WBC # BLD: 7.5 K/UL — SIGNIFICANT CHANGE UP (ref 3.8–10.5)
WBC # FLD AUTO: 7.5 K/UL — SIGNIFICANT CHANGE UP (ref 3.8–10.5)

## 2018-06-09 PROCEDURE — 99233 SBSQ HOSP IP/OBS HIGH 50: CPT | Mod: GC

## 2018-06-09 PROCEDURE — 74018 RADEX ABDOMEN 1 VIEW: CPT | Mod: 26

## 2018-06-09 RX ADMIN — SODIUM CHLORIDE 100 MILLILITER(S): 9 INJECTION INTRAMUSCULAR; INTRAVENOUS; SUBCUTANEOUS at 04:10

## 2018-06-09 RX ADMIN — MORPHINE SULFATE 2 MILLIGRAM(S): 50 CAPSULE, EXTENDED RELEASE ORAL at 06:23

## 2018-06-09 RX ADMIN — HEPARIN SODIUM 5000 UNIT(S): 5000 INJECTION INTRAVENOUS; SUBCUTANEOUS at 06:08

## 2018-06-09 RX ADMIN — MORPHINE SULFATE 2 MILLIGRAM(S): 50 CAPSULE, EXTENDED RELEASE ORAL at 06:08

## 2018-06-09 RX ADMIN — HEPARIN SODIUM 5000 UNIT(S): 5000 INJECTION INTRAVENOUS; SUBCUTANEOUS at 15:37

## 2018-06-09 RX ADMIN — MORPHINE SULFATE 2 MILLIGRAM(S): 50 CAPSULE, EXTENDED RELEASE ORAL at 12:08

## 2018-06-09 RX ADMIN — MORPHINE SULFATE 2 MILLIGRAM(S): 50 CAPSULE, EXTENDED RELEASE ORAL at 18:32

## 2018-06-09 RX ADMIN — Medication 40 MILLIGRAM(S): at 06:15

## 2018-06-09 RX ADMIN — MORPHINE SULFATE 2 MILLIGRAM(S): 50 CAPSULE, EXTENDED RELEASE ORAL at 12:23

## 2018-06-09 RX ADMIN — Medication 2: at 17:30

## 2018-06-09 RX ADMIN — MORPHINE SULFATE 2 MILLIGRAM(S): 50 CAPSULE, EXTENDED RELEASE ORAL at 18:17

## 2018-06-09 RX ADMIN — PANTOPRAZOLE SODIUM 40 MILLIGRAM(S): 20 TABLET, DELAYED RELEASE ORAL at 06:08

## 2018-06-09 NOTE — DISCHARGE NOTE ADULT - OTHER SIGNIFICANT FINDINGS
< from: CT Abdomen and Pelvis w/ Oral Cont and w/ IV Cont (06.07.18 @ 18:14) >  INTERPRETATION:      CT of the ABDOMEN and PELVIS with intravenous contrast dated 6/7/2018   6:14 PM    INDICATION: History of Crohn's disease now withabdominal pain    TECHNIQUE: CT of the abdomen and pelvis with intravenous and oral   contrast. Axial, sagittal, and coronal images were obtained and reviewed.    COMPARISON: Multiple CTs of the abdomen and pelvis dating back to   2/25/2010.    FINDINGS:    Lower chest: Mild linear atelectasis.    Liver: Mild hepatic steatosis.. No focal mass. Portal and hepatic veins   are patent.    Biliary system: Gallbladder is contracted. No calcified gallstones. No   biliary ductal dilatation.    Pancreas: Unremarkable.    Spleen: Unremarkable.    Adrenal glands: Unremarkable.    Kidneys: Symmetric parenchymal enhancement. No renal mass. No   hydronephrosis. No renal or ureteral stone.     Urinary Bladder: Unremarkable.    Reproductive organs: The prostate and seminal vesicles are normal in   appearance.     Bowel/Peritoneum: Ingested contrast seen reaching the distal small bowel.   Unusual anatomy of the small and large bowel is again noted with   descending colon is again noted to be medial to small bowel loops. There   is dilation of the proximal jejunum measuring up to 3.6 cm in diameter.   No discrete transition point are identified to suggest high-grade small   bowel obstruction. There are also interspersed areas of luminal   narrowing, which may represent partial strictures, unchanged. There is   marked submucosal fatty deposition, consistent with chronic inflammatory   bowel disease with pattern of Crohn's disease. There is a short segment   of mild wall thickening and increased mucosal enhancement in the distal   ileum, about 7 cm proximal to the ileocecal valve. This segment measures   about 5 cm in length.  No ascites or extraluminal gas.     Lymph nodes: No lymphadenopathy.    Aorta/IVC: Normal caliber. Retroaortic left renalvein.    Abdominal wall: Diastases of the rectus abdominus muscles at the level of   the umbilicus..    Bones/Soft tissues: No acute abnormality.        IMPRESSION:     Stigmata of chronic Crohn's disease.    5 cm segment of mild wall thickening and mucosal enhancement of the   terminal ileum suspicious for mild acute inflammation.    A few mildly dilated loops of small bowel with possible segments of small   bowel luminal narrowing. Cannot exclude mild/low grade obstruction.   Consider follow-upradiographs to evaluate progression of oral contrast   to colon.    Findings were discussed with Dr. Arroyo at 7:20  and 18.    < end of copied text >

## 2018-06-09 NOTE — DISCHARGE NOTE ADULT - ADDITIONAL INSTRUCTIONS
Please follow up with your primary care physician and any specialists that you see within 1 week of discharge from the hospital.  If you experience any new or worsening symptoms please contact 911 or go to an emergency room immediately.  Please take all medications as prescribed.

## 2018-06-09 NOTE — PROGRESS NOTE ADULT - ATTENDING COMMENTS
Pt seen and examined by me at bedside earlier in AM. Agree with housestaff's exam/a/p as noted above with addition,   +high fever yesterday afternoon, labs wnl, ?dysuria  Fever: check UA/bcx  Crohn flare: f/u GI recs, currently on IV steroid.

## 2018-06-09 NOTE — DISCHARGE NOTE ADULT - CARE PLAN
Principal Discharge DX:	Crohn's colitis  Goal:	.  Assessment and plan of treatment:	You came to the hospital with abdominal pain and diarrhea, and infectious causes of your GI symptoms were ruled out.  You were evaluated by gastroenterology and started on steroids for a flare of your Crohn's disease, and your symptoms improved Principal Discharge DX:	Crohn's colitis  Goal:	.  Assessment and plan of treatment:	You came to the hospital with abdominal pain and diarrhea, and infectious causes of your GI symptoms were ruled out.  You were evaluated by gastroenterology and started on steroids for a flare of your Crohn's disease, and your symptoms improved. You are being discharge on a prednisone taper. You will need to follow up with your regular gastroenterologist within one week for further management.  Secondary Diagnosis:	Hepatitis C  Assessment and plan of treatment:	You have hepatitis C infection. Your condition does not require hospitalization at this time. You should follow up with your gastroenterologist to discuss treatment options. Principal Discharge DX:	Crohn's colitis  Goal:	.  Assessment and plan of treatment:	You came to the hospital with abdominal pain and diarrhea, and infectious causes of your GI symptoms were ruled out.  You were evaluated by gastroenterology and started on steroids for a flare of your Crohn's disease, and your symptoms improved. You are being discharge on a prednisone taper(Prednisone 50mg per day, will decreased by 5mg per week...45mg, 40mg, 35mg, 30mg, 25mg, 20mg). You will need to follow up with your regular gastroenterologist within one week for further management.  Secondary Diagnosis:	Hepatitis C  Assessment and plan of treatment:	You have hepatitis C infection. Your condition does not require hospitalization at this time. You should follow up with your gastroenterologist to discuss treatment options.

## 2018-06-09 NOTE — DISCHARGE NOTE ADULT - PATIENT PORTAL LINK FT
You can access the [facility], offered by [facility], by registering with the following website: [email or web address]

## 2018-06-09 NOTE — DISCHARGE NOTE ADULT - HOSPITAL COURSE
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  He was started on antibiotics for colitis and steroids for Crohns flare.  Gastroenterology was consulted and made recommendations for management.  CT Abdomen/Pelvis demonstrated stigmata of chronic Crohn's disease, and although did not demonstrate high grade obstruction but could not rule out low grade bowel obstruction.  Serial upright abdominal XRays demonstrated PO contrast throughout the GI tract to rectum.  GI PCR and C Diff were both negative, and antibiotics were discontinued.  His abdominal pain and diarrhea slowly improved on IV steroids. 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  He was started on antibiotics for colitis and steroids for Crohns flare.  Gastroenterology was consulted and made recommendations for management.  CT Abdomen/Pelvis demonstrated stigmata of chronic Crohn's disease, and although did not demonstrate high grade obstruction but could not rule out low grade bowel obstruction.  Serial upright abdominal XRays demonstrated PO contrast throughout the GI tract to rectum.  GI PCR and C Diff were both negative, and antibiotics were discontinued.  His abdominal pain and diarrhea slowly improved on IV steroids. Stable and improved for discharge on prednisone taper with GI follow up.

## 2018-06-09 NOTE — PROGRESS NOTE ADULT - PROBLEM SELECTOR PLAN 3
AST/ALT slightly elevated. likely from chronic hep C infection (untreated).   - can check RUQ u/s to r/o nodules/cirrhosis but not likely
AST/ALT slightly elevated. likely from chronic hep C infection (untreated).   - can check RUQ u/s to r/o nodules/cirrhosis but not likely

## 2018-06-09 NOTE — PROGRESS NOTE ADULT - ASSESSMENT
Mr. 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

## 2018-06-09 NOTE — PROGRESS NOTE ADULT - PROBLEM SELECTOR PLAN 2
noncompliant with GI f/u and treatment.   No s/s or e/o liver disease.   - f/u GI reccs  - Hepatitis B negative  - f/u Quant Gold
noncompliant with GI f/u and treatment.   No s/s or e/o liver disease.   - f/u GI reccs  - obtain Hep B panel and Quant Gold

## 2018-06-09 NOTE — DISCHARGE NOTE ADULT - PLAN OF CARE
. You came to the hospital with abdominal pain and diarrhea, and infectious causes of your GI symptoms were ruled out.  You were evaluated by gastroenterology and started on steroids for a flare of your Crohn's disease, and your symptoms improved You came to the hospital with abdominal pain and diarrhea, and infectious causes of your GI symptoms were ruled out.  You were evaluated by gastroenterology and started on steroids for a flare of your Crohn's disease, and your symptoms improved. You are being discharge on a prednisone taper. You will need to follow up with your regular gastroenterologist within one week for further management. You have hepatitis C infection. Your condition does not require hospitalization at this time. You should follow up with your gastroenterologist to discuss treatment options. You came to the hospital with abdominal pain and diarrhea, and infectious causes of your GI symptoms were ruled out.  You were evaluated by gastroenterology and started on steroids for a flare of your Crohn's disease, and your symptoms improved. You are being discharge on a prednisone taper(Prednisone 50mg per day, will decreased by 5mg per week...45mg, 40mg, 35mg, 30mg, 25mg, 20mg). You will need to follow up with your regular gastroenterologist within one week for further management.

## 2018-06-09 NOTE — PROGRESS NOTE ADULT - PROBLEM SELECTOR PLAN 5
Low residue diet  IVF @100cc/hr  replete lytes prn
Low residue diet  PO Hydration  replete lytes prn

## 2018-06-09 NOTE — PROGRESS NOTE ADULT - PROBLEM SELECTOR PLAN 1
Presents with RLQ abdominal pain and diarrhea with hematochezia. he was recently d/c from [facility] after a Crohn's flare and has been on a prednisone taper. His symptoms began 4 days ago and have gotten worse. He is noncompliant with his maintenance therapy of Pentasa and reportedly has 'failed' biologics before, although he is a poor historian.   - Crohn's disease activity index is 230 which correlates with mod to severe disease.   - s/p Cipro and flagyl in ED.   - discontinue Ceftriaxone 1q24 and flagyl 500q8  - IV Solumedrol 40mg daily   CT a/p cannot exclude mild/low grade obstruction.  - general surgery consulted, no reccs for urgent surgery  - serial abdominal XRays demonstrate PO contrast movement throughout colon   - PO hydration  - f/u stool count, culture, o&p  - C Diff negative  - GI consulted, appreciate reccs
Presents with RLQ abdominal pain and diarrhea with hematochezia. he was recently d/c from [facility] after a Crohn's flare and has been on a prednisone taper. His symptoms began 4 days ago and have gotten worse. He is noncompliant with his maintenance therapy of Pentasa and reportedly has 'failed' biologics before, although he is a poor historian.   - Crohn's disease activity index is 230 which correlates with mod to severe disease.   - s/p Cipro and flagyl in ED.   - Ceftriaxone 1q24 and flagyl 500q8  - IV Solumedrol 40mg daily   CT a/p cannot exclude mild/low grade obstruction.  - general surgery consulted, no reccs for urgent surgery  - serial abdominal XRays demonstrate PO contrast movement throughout colon   - NS at 100cc/hr for hydration  - check stool count, culture, o&p, and check for c-diff as common in IBD population (plus, patient was recently hospitalized and given antibx)  - GI consulted, appreciate reccs

## 2018-06-09 NOTE — PROGRESS NOTE ADULT - SUBJECTIVE AND OBJECTIVE BOX
Pt seen and examined at bedside  No new c/o  Reports that he feels much better than when admitted  Denies n/v, fever, chills, still with some mild cramping and loose stools      MEDICATIONS:  MEDICATIONS  (STANDING):  dextrose 5%. 1000 milliLiter(s) (50 mL/Hr) IV Continuous <Continuous>  dextrose 50% Injectable 12.5 Gram(s) IV Push once  dextrose 50% Injectable 25 Gram(s) IV Push once  dextrose 50% Injectable 25 Gram(s) IV Push once  heparin  Injectable 5000 Unit(s) SubCutaneous every 8 hours  insulin lispro (HumaLOG) corrective regimen sliding scale   SubCutaneous Before meals and at bedtime  methylPREDNISolone sodium succinate Injectable 40 milliGRAM(s) IV Push daily  pantoprazole    Tablet 40 milliGRAM(s) Oral before breakfast    MEDICATIONS  (PRN):  acetaminophen   Tablet 650 milliGRAM(s) Oral every 6 hours PRN For Temp greater than 38 C (100.4 F)  dextrose 40% Gel 15 Gram(s) Oral once PRN Blood Glucose LESS THAN 70 milliGRAM(s)/deciliter  glucagon  Injectable 1 milliGRAM(s) IntraMuscular once PRN Glucose LESS THAN 70 milligrams/deciliter  morphine  - Injectable 2 milliGRAM(s) IV Push every 6 hours PRN Severe Pain (7 - 10)  ondansetron Injectable 4 milliGRAM(s) IV Push every 6 hours PRN Nausea      Allergies  No Known Allergies    Intolerances      Vital Signs Last 24 Hrs  T(C): 36.9 (09 Jun 2018 09:05), Max: 39.2 (08 Jun 2018 16:35)  T(F): 98.4 (09 Jun 2018 09:05), Max: 102.5 (08 Jun 2018 16:35)  HR: 70 (09 Jun 2018 09:05) (58 - 98)  BP: 117/78 (09 Jun 2018 09:05) (105/69 - 117/78)  BP(mean): --  RR: 18 (09 Jun 2018 09:05) (16 - 18)  SpO2: 97% (09 Jun 2018 09:05) (94% - 97%)    06-08 @ 07:01  -  06-09 @ 07:00  --------------------------------------------------------  IN: 1100 mL / OUT: 0 mL / NET: 1100 mL        PHYSICAL EXAM:  General: Well developed; well nourished; in no acute distress  HEENT: MMM, conjunctiva and sclera clear, no oral ulcers  Gastrointestinal: Soft, RLQ TTP non-distended; No rebound or guarding; No spider angiomas, caput medusae, or palmar erythema  Extremities: Normal range of motion, No clubbing, cyanosis or edema  Neurological: Alert and oriented x3  Skin: Warm and dry. No obvious rash      LABS:  CBC Full  -  ( 09 Jun 2018 10:37 )  WBC Count : 7.5 K/uL  Hemoglobin : 13.0 g/dL  Hematocrit : 40.5 %  Platelet Count - Automated : 257 K/uL  Mean Cell Volume : 89.4 fL  Mean Cell Hemoglobin : 28.7 pg  Mean Cell Hemoglobin Concentration : 32.1 g/dL    136  |  100  |  13  ----------------------------<  106<H>  4.5   |  24  |  1.01    Ca    9.5      09 Jun 2018 10:37  Phos  3.2     06-08  Mg     2.2     06-09    TPro  7.7  /  Alb  4.0  /  TBili  0.8  /  DBili  x   /  AST  59<H>  /  ALT  59<H>  /  AlkPhos  84  06-08    PT/INR - ( 08 Jun 2018 07:08 )   PT: 10.0 sec;   INR: 0.90       PTT - ( 08 Jun 2018 07:08 )  PTT:35.2 sec          RADIOLOGY & ADDITIONAL STUDIES (The following images were personally reviewed):

## 2018-06-09 NOTE — DISCHARGE NOTE ADULT - MEDICATION SUMMARY - MEDICATIONS TO STOP TAKING
I will STOP taking the medications listed below when I get home from the hospital:    Cipro 500 mg oral tablet  -- 1 tab(s) by mouth every 12 hours    Flagyl 500 mg oral tablet  -- 1 tab(s) by mouth every 8 hours    predniSONE 5 mg oral tablet  -- 6 tab(s) by mouth 2 times a day  -- It is very important that you take or use this exactly as directed.  Do not skip doses or discontinue unless directed by your doctor.  Obtain medical advice before taking any non-prescription drugs as some may affect the action of this medication.  Take with food or milk.    predniSONE 5 mg oral tablet  -- 5 tab(s) by mouth 2 times a day  -- It is very important that you take or use this exactly as directed.  Do not skip doses or discontinue unless directed by your doctor.  Obtain medical advice before taking any non-prescription drugs as some may affect the action of this medication.  Take with food or milk.    predniSONE 5 mg oral tablet  -- 4 tab(s) by mouth 2 times a day  -- It is very important that you take or use this exactly as directed.  Do not skip doses or discontinue unless directed by your doctor.  Obtain medical advice before taking any non-prescription drugs as some may affect the action of this medication.  Take with food or milk.    predniSONE 5 mg oral tablet  -- 3 tab(s) by mouth 2 times a day  -- It is very important that you take or use this exactly as directed.  Do not skip doses or discontinue unless directed by your doctor.  Obtain medical advice before taking any non-prescription drugs as some may affect the action of this medication.  Take with food or milk.    predniSONE 5 mg oral tablet  -- 2 tab(s) by mouth 2 times a day  -- It is very important that you take or use this exactly as directed.  Do not skip doses or discontinue unless directed by your doctor.  Obtain medical advice before taking any non-prescription drugs as some may affect the action of this medication.  Take with food or milk.    predniSONE 5 mg oral tablet  -- 1 tab(s) by mouth 2 times a day  -- It is very important that you take or use this exactly as directed.  Do not skip doses or discontinue unless directed by your doctor.  Obtain medical advice before taking any non-prescription drugs as some may affect the action of this medication.  Take with food or milk.    predniSONE 5 mg oral delayed release tablet  -- 1 tab(s) by mouth once a day   -- Do not chew, break, or crush.  It is very important that you take or use this exactly as directed.  Do not skip doses or discontinue unless directed by your doctor.  Obtain medical advice before taking any non-prescription drugs as some may affect the action of this medication.  Swallow whole.  Do not crush.  Take with food.    Cipro 500 mg oral tablet  -- 1 tab(s) by mouth every 12 hours  -- Avoid prolonged or excessive exposure to direct and/or artificial sunlight while taking this medication.  Check with your doctor before becoming pregnant.  Do not take dairy products, antacids, or iron preparations within one hour of this medication.  Finish all this medication unless otherwise directed by prescriber.  Medication should be taken with plenty of water.    Flagyl 500 mg oral tablet  -- 1 tab(s) by mouth every 8 hours  -- Do not drink alcoholic beverages when taking this medication.  Finish all this medication unless otherwise directed by prescriber.  May discolor urine or feces.

## 2018-06-09 NOTE — PROGRESS NOTE ADULT - ASSESSMENT
43 YO M livan historian with PMHx Crohn's disease (Dx 9/2007) and chronic hepatitis C (no prior treatment) 2/2 tattoo ink needles and alcohol use p/w diarrhea, abdominal pain, vomiting, and joint pain x 4 days.    # Crohn's disease -  - likely with flare  - C. diff - negative, stool cx - negative  - Recommend decreasing to Solumedrol 40 mg IVP daily  - Recommend discontinuing ciprofloxacin and flagyl as pt does not meet SIRS criteria and low suspicion for underlying infectious etiology of his symptoms  - Hep BsAg (non reactive)  - quantiferon gold - pending - but likely will be indeterminate as patient is on steroids  - Discussed with pt at length regarding need for follow-up with his primary GI and initiation of long term maintenance therapy to control his CD flares, avoidance of the adverse effects of long term steroid use, and avoiding hospital admissions as he has had multiple admission for flares and has poor control of his underlying illness  - Please obtain report of his recent colonoscopy at [facility]    # Chronic HCV - stable  - Pt to follow-up with his primary GI for consideration of tx    Case d/w Dr. Dey  GI will follow 41 YO M livan historian with PMHx Crohn's disease (Dx 9/2007) and chronic hepatitis C (no prior treatment) 2/2 tattoo ink needles and alcohol use p/w diarrhea, abdominal pain, vomiting, and joint pain x 4 days.    # Crohn's disease -  - likely with flare  - C. diff - negative, stool cx - negative  - Recommend decreasing to Solumedrol 40 mg IVP daily  - Recommend discontinuing ciprofloxacin and flagyl as pt does not meet SIRS criteria and low suspicion for underlying infectious etiology of his symptoms  - Hep BsAb (non reactive) - please get HBsAg  - quantiferon gold - pending - but likely will be indeterminate as patient is on steroids  - Discussed with pt at length regarding need for follow-up with his primary GI and initiation of long term maintenance therapy to control his CD flares, avoidance of the adverse effects of long term steroid use, and avoiding hospital admissions as he has had multiple admission for flares and has poor control of his underlying illness  - Please obtain report of his recent colonoscopy at [facility]    # Chronic HCV - stable  - Pt to follow-up with his primary GI for consideration of tx    Case d/w Dr. Dey  GI will follow

## 2018-06-09 NOTE — PROGRESS NOTE ADULT - SUBJECTIVE AND OBJECTIVE BOX
OVERNIGHT EVENTS: TIA    SUBJECTIVE / INTERVAL HPI: Patient seen and examined at bedside. Feels well, no abdominal pain. 3 BMs last night, loose, no nausea/abdominal pain    VITAL SIGNS:  Vital Signs Last 24 Hrs  T(C): 36.6 (09 Jun 2018 05:52), Max: 39.2 (08 Jun 2018 16:35)  T(F): 97.8 (09 Jun 2018 05:52), Max: 102.5 (08 Jun 2018 16:35)  HR: 84 (09 Jun 2018 05:52) (58 - 98)  BP: 107/68 (09 Jun 2018 05:52) (105/69 - 132/85)  BP(mean): --  RR: 17 (09 Jun 2018 05:52) (16 - 18)  SpO2: 95% (09 Jun 2018 05:52) (94% - 96%)    PHYSICAL EXAM:    General: WDWN M standing at bedside in NAD, pleasant and interactive  HEENT: NC/AT; PERRL, anicteric sclera; MMM  Neck: supple without LAD  Cardiovascular: +S1/S2; RRR  Respiratory: CTA B/L; no W/R/R  Gastrointestinal: soft, NT/ND; +BS  Extremities: WWP; no edema, clubbing or cyanosis  Vascular: 2+ radial, DP/PT pulses B/L  Neurological: AAOx3; no focal deficits, moves all extremities and follows commands    MEDICATIONS:  MEDICATIONS  (STANDING):  dextrose 5%. 1000 milliLiter(s) (50 mL/Hr) IV Continuous <Continuous>  dextrose 50% Injectable 12.5 Gram(s) IV Push once  dextrose 50% Injectable 25 Gram(s) IV Push once  dextrose 50% Injectable 25 Gram(s) IV Push once  heparin  Injectable 5000 Unit(s) SubCutaneous every 8 hours  insulin lispro (HumaLOG) corrective regimen sliding scale   SubCutaneous Before meals and at bedtime  methylPREDNISolone sodium succinate Injectable 40 milliGRAM(s) IV Push daily  pantoprazole    Tablet 40 milliGRAM(s) Oral before breakfast  sodium chloride 0.9%. 1000 milliLiter(s) (100 mL/Hr) IV Continuous <Continuous>    MEDICATIONS  (PRN):  acetaminophen   Tablet 650 milliGRAM(s) Oral every 6 hours PRN For Temp greater than 38 C (100.4 F)  dextrose 40% Gel 15 Gram(s) Oral once PRN Blood Glucose LESS THAN 70 milliGRAM(s)/deciliter  glucagon  Injectable 1 milliGRAM(s) IntraMuscular once PRN Glucose LESS THAN 70 milligrams/deciliter  morphine  - Injectable 2 milliGRAM(s) IV Push every 6 hours PRN Severe Pain (7 - 10)  ondansetron Injectable 4 milliGRAM(s) IV Push every 6 hours PRN Nausea      ALLERGIES:  Allergies    No Known Allergies    Intolerances        LABS:                        13.7   6.6   )-----------( 227      ( 08 Jun 2018 07:08 )             42.4     06-08    135  |  98  |  14  ----------------------------<  115<H>  4.3   |  25  |  1.08    Ca    9.5      08 Jun 2018 07:08  Phos  3.2     06-08  Mg     2.1     06-08    TPro  7.7  /  Alb  4.0  /  TBili  0.8  /  DBili  x   /  AST  59<H>  /  ALT  59<H>  /  AlkPhos  84  06-08    PT/INR - ( 08 Jun 2018 07:08 )   PT: 10.0 sec;   INR: 0.90          PTT - ( 08 Jun 2018 07:08 )  PTT:35.2 sec    CAPILLARY BLOOD GLUCOSE      POCT Blood Glucose.: 103 mg/dL (09 Jun 2018 08:23)      RADIOLOGY & ADDITIONAL TESTS: Reviewed.    ASSESSMENT:    PLAN:

## 2018-06-09 NOTE — DISCHARGE NOTE ADULT - MEDICATION SUMMARY - MEDICATIONS TO TAKE
I will START or STAY ON the medications listed below when I get home from the hospital:    predniSONE 50 mg oral tablet  -- 1 tab(s) by mouth once a day   -- It is very important that you take or use this exactly as directed.  Do not skip doses or discontinue unless directed by your doctor.  Obtain medical advice before taking any non-prescription drugs as some may affect the action of this medication.  Take with food or milk.    -- Indication: For CROHN DISEASE    predniSONE 10 mg oral tablet  -- Week 1: 4.5 tabs QD Week 2: 4 tabs QD  Week 3: 3.5 tabs QD Week 4: 3 tabs QD  Week 5: 2.5 tabs QD Week 6: 2 tabs QD  -- It is very important that you take or use this exactly as directed.  Do not skip doses or discontinue unless directed by your doctor.  Obtain medical advice before taking any non-prescription drugs as some may affect the action of this medication.  Take with food or milk.    -- Indication: For CROHN DISEASE    oxyCODONE-acetaminophen 5 mg-325 mg oral tablet  -- 1 tab(s) by mouth every 6 hours MDD:4  -- Caution federal law prohibits the transfer of this drug to any person other  than the person for whom it was prescribed.  May cause drowsiness.  Alcohol may intensify this effect.  Use care when operating dangerous machinery.  This prescription cannot be refilled.  This product contains acetaminophen.  Do not use  with any other product containing acetaminophen to prevent possible liver damage.  Using more of this medication than prescribed may cause serious breathing problems.    -- Indication: For Pain

## 2018-06-10 VITALS
SYSTOLIC BLOOD PRESSURE: 116 MMHG | DIASTOLIC BLOOD PRESSURE: 70 MMHG | OXYGEN SATURATION: 97 % | RESPIRATION RATE: 18 BRPM | HEART RATE: 74 BPM | TEMPERATURE: 98 F

## 2018-06-10 LAB
ANION GAP SERPL CALC-SCNC: 11 MMOL/L — SIGNIFICANT CHANGE UP (ref 5–17)
BUN SERPL-MCNC: 17 MG/DL — SIGNIFICANT CHANGE UP (ref 7–23)
CALCIUM SERPL-MCNC: 9.5 MG/DL — SIGNIFICANT CHANGE UP (ref 8.4–10.5)
CHLORIDE SERPL-SCNC: 101 MMOL/L — SIGNIFICANT CHANGE UP (ref 96–108)
CO2 SERPL-SCNC: 27 MMOL/L — SIGNIFICANT CHANGE UP (ref 22–31)
CREAT SERPL-MCNC: 1.1 MG/DL — SIGNIFICANT CHANGE UP (ref 0.5–1.3)
CULTURE RESULTS: SIGNIFICANT CHANGE UP
GLUCOSE BLDC GLUCOMTR-MCNC: 109 MG/DL — HIGH (ref 70–99)
GLUCOSE SERPL-MCNC: 86 MG/DL — SIGNIFICANT CHANGE UP (ref 70–99)
HCT VFR BLD CALC: 39.8 % — SIGNIFICANT CHANGE UP (ref 39–50)
HGB BLD-MCNC: 13 G/DL — SIGNIFICANT CHANGE UP (ref 13–17)
MAGNESIUM SERPL-MCNC: 2.2 MG/DL — SIGNIFICANT CHANGE UP (ref 1.6–2.6)
MCHC RBC-ENTMCNC: 29.3 PG — SIGNIFICANT CHANGE UP (ref 27–34)
MCHC RBC-ENTMCNC: 32.7 G/DL — SIGNIFICANT CHANGE UP (ref 32–36)
MCV RBC AUTO: 89.8 FL — SIGNIFICANT CHANGE UP (ref 80–100)
PLATELET # BLD AUTO: 230 K/UL — SIGNIFICANT CHANGE UP (ref 150–400)
POTASSIUM SERPL-MCNC: 3.8 MMOL/L — SIGNIFICANT CHANGE UP (ref 3.5–5.3)
POTASSIUM SERPL-SCNC: 3.8 MMOL/L — SIGNIFICANT CHANGE UP (ref 3.5–5.3)
RBC # BLD: 4.43 M/UL — SIGNIFICANT CHANGE UP (ref 4.2–5.8)
RBC # FLD: 14.3 % — SIGNIFICANT CHANGE UP (ref 10.3–16.9)
SODIUM SERPL-SCNC: 139 MMOL/L — SIGNIFICANT CHANGE UP (ref 135–145)
SPECIMEN SOURCE: SIGNIFICANT CHANGE UP
WBC # BLD: 5 K/UL — SIGNIFICANT CHANGE UP (ref 3.8–10.5)
WBC # FLD AUTO: 5 K/UL — SIGNIFICANT CHANGE UP (ref 3.8–10.5)

## 2018-06-10 PROCEDURE — 87449 NOS EACH ORGANISM AG IA: CPT

## 2018-06-10 PROCEDURE — 83735 ASSAY OF MAGNESIUM: CPT

## 2018-06-10 PROCEDURE — 96376 TX/PRO/DX INJ SAME DRUG ADON: CPT

## 2018-06-10 PROCEDURE — 99239 HOSP IP/OBS DSCHRG MGMT >30: CPT

## 2018-06-10 PROCEDURE — 36415 COLL VENOUS BLD VENIPUNCTURE: CPT

## 2018-06-10 PROCEDURE — 74177 CT ABD & PELVIS W/CONTRAST: CPT

## 2018-06-10 PROCEDURE — 99285 EMERGENCY DEPT VISIT HI MDM: CPT | Mod: 25

## 2018-06-10 PROCEDURE — 93005 ELECTROCARDIOGRAM TRACING: CPT

## 2018-06-10 PROCEDURE — 96375 TX/PRO/DX INJ NEW DRUG ADDON: CPT

## 2018-06-10 PROCEDURE — 80048 BASIC METABOLIC PNL TOTAL CA: CPT

## 2018-06-10 PROCEDURE — 80053 COMPREHEN METABOLIC PANEL: CPT

## 2018-06-10 PROCEDURE — 85027 COMPLETE CBC AUTOMATED: CPT

## 2018-06-10 PROCEDURE — 87507 IADNA-DNA/RNA PROBE TQ 12-25: CPT

## 2018-06-10 PROCEDURE — 87046 STOOL CULTR AEROBIC BACT EA: CPT

## 2018-06-10 PROCEDURE — 85025 COMPLETE CBC W/AUTO DIFF WBC: CPT

## 2018-06-10 PROCEDURE — 87177 OVA AND PARASITES SMEARS: CPT

## 2018-06-10 PROCEDURE — 87045 FECES CULTURE AEROBIC BACT: CPT

## 2018-06-10 PROCEDURE — 83690 ASSAY OF LIPASE: CPT

## 2018-06-10 PROCEDURE — 74018 RADEX ABDOMEN 1 VIEW: CPT

## 2018-06-10 PROCEDURE — 86140 C-REACTIVE PROTEIN: CPT

## 2018-06-10 PROCEDURE — 86706 HEP B SURFACE ANTIBODY: CPT

## 2018-06-10 PROCEDURE — 87324 CLOSTRIDIUM AG IA: CPT

## 2018-06-10 PROCEDURE — 82962 GLUCOSE BLOOD TEST: CPT

## 2018-06-10 PROCEDURE — 85652 RBC SED RATE AUTOMATED: CPT

## 2018-06-10 PROCEDURE — 85730 THROMBOPLASTIN TIME PARTIAL: CPT

## 2018-06-10 PROCEDURE — 85610 PROTHROMBIN TIME: CPT

## 2018-06-10 PROCEDURE — 96374 THER/PROPH/DIAG INJ IV PUSH: CPT | Mod: XU

## 2018-06-10 PROCEDURE — 87040 BLOOD CULTURE FOR BACTERIA: CPT

## 2018-06-10 PROCEDURE — 84100 ASSAY OF PHOSPHORUS: CPT

## 2018-06-10 RX ADMIN — Medication 40 MILLIGRAM(S): at 05:54

## 2018-06-10 RX ADMIN — MORPHINE SULFATE 2 MILLIGRAM(S): 50 CAPSULE, EXTENDED RELEASE ORAL at 10:47

## 2018-06-10 RX ADMIN — MORPHINE SULFATE 2 MILLIGRAM(S): 50 CAPSULE, EXTENDED RELEASE ORAL at 06:21

## 2018-06-10 RX ADMIN — PANTOPRAZOLE SODIUM 40 MILLIGRAM(S): 20 TABLET, DELAYED RELEASE ORAL at 06:00

## 2018-06-10 RX ADMIN — MORPHINE SULFATE 2 MILLIGRAM(S): 50 CAPSULE, EXTENDED RELEASE ORAL at 00:32

## 2018-06-10 RX ADMIN — MORPHINE SULFATE 2 MILLIGRAM(S): 50 CAPSULE, EXTENDED RELEASE ORAL at 00:17

## 2018-06-10 RX ADMIN — MORPHINE SULFATE 2 MILLIGRAM(S): 50 CAPSULE, EXTENDED RELEASE ORAL at 06:36

## 2018-06-10 NOTE — PROGRESS NOTE ADULT - SUBJECTIVE AND OBJECTIVE BOX
Pt seen and examined at bedside  Feels better  Reports that stools are getting more formed  No n/v, bleeding, pain has improved      MEDICATIONS:  MEDICATIONS  (STANDING):  dextrose 5%. 1000 milliLiter(s) (50 mL/Hr) IV Continuous <Continuous>  dextrose 50% Injectable 12.5 Gram(s) IV Push once  dextrose 50% Injectable 25 Gram(s) IV Push once  dextrose 50% Injectable 25 Gram(s) IV Push once  heparin  Injectable 5000 Unit(s) SubCutaneous every 8 hours  insulin lispro (HumaLOG) corrective regimen sliding scale   SubCutaneous Before meals and at bedtime  methylPREDNISolone sodium succinate Injectable 40 milliGRAM(s) IV Push daily  pantoprazole    Tablet 40 milliGRAM(s) Oral before breakfast    MEDICATIONS  (PRN):  acetaminophen   Tablet 650 milliGRAM(s) Oral every 6 hours PRN For Temp greater than 38 C (100.4 F)  dextrose 40% Gel 15 Gram(s) Oral once PRN Blood Glucose LESS THAN 70 milliGRAM(s)/deciliter  glucagon  Injectable 1 milliGRAM(s) IntraMuscular once PRN Glucose LESS THAN 70 milligrams/deciliter  morphine  - Injectable 2 milliGRAM(s) IV Push every 6 hours PRN Severe Pain (7 - 10)  ondansetron Injectable 4 milliGRAM(s) IV Push every 6 hours PRN Nausea      Allergies  No Known Allergies    Intolerances        Vital Signs Last 24 Hrs  T(C): 36.6 (10 Maverick 2018 08:51), Max: 37 (09 Jun 2018 15:52)  T(F): 97.9 (10 Maverick 2018 08:51), Max: 98.6 (09 Jun 2018 15:52)  HR: 74 (10 Maverick 2018 08:51) (61 - 75)  BP: 116/70 (10 Maverick 2018 08:51) (103/64 - 121/65)  BP(mean): --  RR: 18 (10 Maverick 2018 08:51) (16 - 18)  SpO2: 97% (10 Maverick 2018 08:51) (95% - 97%)    06-09 @ 07:01  -  06-10 @ 07:00  --------------------------------------------------------  IN: 0 mL / OUT: 350 mL / NET: -350 mL      PHYSICAL EXAM:  General: Well developed; well nourished; in no acute distress  HEENT: MMM, conjunctiva and sclera clear, no oral ulcers  Gastrointestinal: Soft, obese, BS+, NT, NR, NG, no masses or organs palpated   Extremities: Normal range of motion, No clubbing, cyanosis or edema  Neurological: Alert and oriented x3  Skin: Warm and dry. No obvious rash      LABS:  CBC Full  -  ( 10 Maverick 2018 07:01 )  WBC Count : 5.0 K/uL  Hemoglobin : 13.0 g/dL  Hematocrit : 39.8 %  Platelet Count - Automated : 230 K/uL  Mean Cell Volume : 89.8 fL  Mean Cell Hemoglobin : 29.3 pg  Mean Cell Hemoglobin Concentration : 32.7 g/dL    139  |  101  |  17  ----------------------------<  86  3.8   |  27  |  1.10    Ca    9.5      10 Maverick 2018 07:04  Mg     2.2     06-10          RADIOLOGY & ADDITIONAL STUDIES (The following images were personally reviewed):

## 2018-06-10 NOTE — PROGRESS NOTE ADULT - ASSESSMENT
43 YO M livan historian with PMHx Crohn's disease (Dx 9/2007) and chronic hepatitis C (no prior treatment) 2/2 tattoo ink needles and alcohol use p/w diarrhea, abdominal pain, vomiting, and joint pain x 4 days.    # Crohn's disease -  - likely with flare  - C. diff - negative, stool cx - negative  - Recommend switching Solumedrol 40 mg IVP daily to oral prednisone 50mg daily and he can taper off by 5mg weekly till he sees his gastroenterologist  - Hep BsAb (non reactive) - please get HBsAg  - quantiferon gold - pending - but likely will be indeterminate as patient is on steroids  - Discussed with pt at length regarding need for follow-up with his primary GI and initiation of long term maintenance therapy to control his CD flares, avoidance of the adverse effects of long term steroid use, and avoiding hospital admissions as he has had multiple admission for flares and has poor control of his underlying illness  - Please try to obtain report of his recent colonoscopy at [facility]    # Chronic HCV - stable  - Pt to follow-up with his primary GI for consideration of tx    Case d/w Dr. Dey  GI will follow

## 2018-06-12 LAB
CULTURE RESULTS: SIGNIFICANT CHANGE UP
M TB TUBERC IFN-G BLD QL: 0 IU/ML — SIGNIFICANT CHANGE UP
M TB TUBERC IFN-G BLD QL: 0.02 IU/ML — SIGNIFICANT CHANGE UP
M TB TUBERC IFN-G BLD QL: ABNORMAL
MITOGEN IGNF BCKGRD COR BLD-ACNC: 0.2 IU/ML — SIGNIFICANT CHANGE UP
SPECIMEN SOURCE: SIGNIFICANT CHANGE UP

## 2018-06-13 DIAGNOSIS — T38.0X5A ADVERSE EFFECT OF GLUCOCORTICOIDS AND SYNTHETIC ANALOGUES, INITIAL ENCOUNTER: ICD-10-CM

## 2018-06-13 DIAGNOSIS — R00.0 TACHYCARDIA, UNSPECIFIED: ICD-10-CM

## 2018-06-13 DIAGNOSIS — K50.90 CROHN'S DISEASE, UNSPECIFIED, WITHOUT COMPLICATIONS: ICD-10-CM

## 2018-06-13 DIAGNOSIS — K92.1 MELENA: ICD-10-CM

## 2018-06-13 DIAGNOSIS — Z91.14 PATIENT'S OTHER NONCOMPLIANCE WITH MEDICATION REGIMEN: ICD-10-CM

## 2018-06-13 DIAGNOSIS — K56.609 UNSPECIFIED INTESTINAL OBSTRUCTION, UNSPECIFIED AS TO PARTIAL VERSUS COMPLETE OBSTRUCTION: ICD-10-CM

## 2018-06-13 DIAGNOSIS — Z72.89 OTHER PROBLEMS RELATED TO LIFESTYLE: ICD-10-CM
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
## 2018-06-13 DIAGNOSIS — B18.2 CHRONIC VIRAL HEPATITIS C: ICD-10-CM

## 2018-06-13 DIAGNOSIS — R73.9 HYPERGLYCEMIA, UNSPECIFIED: ICD-10-CM

## 2018-06-13 DIAGNOSIS — K50.00 CROHN'S DISEASE OF SMALL INTESTINE WITHOUT COMPLICATIONS: ICD-10-CM

## 2018-06-14 LAB
CULTURE RESULTS: SIGNIFICANT CHANGE UP
SPECIMEN SOURCE: SIGNIFICANT CHANGE UP

## 2020-05-22 NOTE — ED ADULT NURSE NOTE - PAIN: PRESENCE, MLM
Is This A New Presentation, Or A Follow-Up?: Skin Lesion
How Severe Is Your Skin Lesion?: moderate
Have Your Skin Lesions Been Treated?: not been treated
Additional History: New patient.
complains of pain/discomfort

## 2022-03-15 NOTE — ED ADULT NURSE NOTE - NS SC CAGE ALCOHOL EYE OPENER
----- Message from Marjorie Wright sent at 3/15/2022  2:56 PM CDT -----  Regarding: Lab orders  Patient has a lab appointment on 3/22/22, needs lab orders.   Thank you      no